# Patient Record
Sex: MALE | Race: WHITE | URBAN - METROPOLITAN AREA
[De-identification: names, ages, dates, MRNs, and addresses within clinical notes are randomized per-mention and may not be internally consistent; named-entity substitution may affect disease eponyms.]

---

## 2017-01-01 ENCOUNTER — TRANSFERRED RECORDS (OUTPATIENT)
Dept: HEALTH INFORMATION MANAGEMENT | Facility: CLINIC | Age: 72
End: 2017-01-01

## 2017-01-01 ENCOUNTER — PRE VISIT (OUTPATIENT)
Dept: PULMONOLOGY | Facility: CLINIC | Age: 72
End: 2017-01-01

## 2017-01-01 ENCOUNTER — TELEPHONE (OUTPATIENT)
Dept: ONCOLOGY | Facility: CLINIC | Age: 72
End: 2017-01-01

## 2017-01-01 ENCOUNTER — ONCOLOGY VISIT (OUTPATIENT)
Dept: ONCOLOGY | Facility: CLINIC | Age: 72
End: 2017-01-01
Payer: COMMERCIAL

## 2017-01-01 ENCOUNTER — HOSPITAL ENCOUNTER (OUTPATIENT)
Dept: MRI IMAGING | Facility: CLINIC | Age: 72
End: 2017-03-14
Attending: INTERNAL MEDICINE
Payer: MEDICARE

## 2017-01-01 ENCOUNTER — HOSPITAL ENCOUNTER (OUTPATIENT)
Facility: CLINIC | Age: 72
End: 2017-01-01
Attending: INTERNAL MEDICINE | Admitting: INTERNAL MEDICINE
Payer: MEDICARE

## 2017-01-01 ENCOUNTER — OFFICE VISIT (OUTPATIENT)
Dept: INTERVENTIONAL RADIOLOGY/VASCULAR | Facility: CLINIC | Age: 72
End: 2017-01-01

## 2017-01-01 ENCOUNTER — APPOINTMENT (OUTPATIENT)
Dept: MEDSURG UNIT | Facility: CLINIC | Age: 72
End: 2017-01-01
Attending: INTERNAL MEDICINE
Payer: MEDICARE

## 2017-01-01 ENCOUNTER — MEDICAL CORRESPONDENCE (OUTPATIENT)
Dept: HEALTH INFORMATION MANAGEMENT | Facility: CLINIC | Age: 72
End: 2017-01-01

## 2017-01-01 ENCOUNTER — OFFICE VISIT (OUTPATIENT)
Dept: PULMONOLOGY | Facility: CLINIC | Age: 72
End: 2017-01-01
Attending: INTERNAL MEDICINE
Payer: MEDICARE

## 2017-01-01 ENCOUNTER — TELEPHONE (OUTPATIENT)
Dept: INTERVENTIONAL RADIOLOGY/VASCULAR | Facility: CLINIC | Age: 72
End: 2017-01-01

## 2017-01-01 ENCOUNTER — HOSPITAL ENCOUNTER (OUTPATIENT)
Facility: CLINIC | Age: 72
Discharge: NURSING, REHAB OR LONG-TERM CARE | End: 2017-04-06
Attending: INTERNAL MEDICINE | Admitting: INTERNAL MEDICINE
Payer: MEDICARE

## 2017-01-01 ENCOUNTER — DOCUMENTATION ONLY (OUTPATIENT)
Dept: OTHER | Facility: CLINIC | Age: 72
End: 2017-01-01

## 2017-01-01 ENCOUNTER — CARE COORDINATION (OUTPATIENT)
Dept: ONCOLOGY | Facility: CLINIC | Age: 72
End: 2017-01-01

## 2017-01-01 ENCOUNTER — HOSPITAL ENCOUNTER (OUTPATIENT)
Dept: PET IMAGING | Facility: CLINIC | Age: 72
Discharge: HOME | End: 2017-03-14
Attending: INTERNAL MEDICINE | Admitting: INTERNAL MEDICINE
Payer: MEDICARE

## 2017-01-01 ENCOUNTER — HOSPITAL ENCOUNTER (OUTPATIENT)
Dept: CT IMAGING | Facility: CLINIC | Age: 72
End: 2017-04-06
Attending: CLINICAL NURSE SPECIALIST | Admitting: INTERNAL MEDICINE
Payer: MEDICARE

## 2017-01-01 VITALS
HEART RATE: 80 BPM | HEIGHT: 68 IN | RESPIRATION RATE: 20 BRPM | TEMPERATURE: 97.4 F | DIASTOLIC BLOOD PRESSURE: 84 MMHG | BODY MASS INDEX: 27.13 KG/M2 | SYSTOLIC BLOOD PRESSURE: 146 MMHG | OXYGEN SATURATION: 95 % | WEIGHT: 179 LBS

## 2017-01-01 VITALS
TEMPERATURE: 98.1 F | SYSTOLIC BLOOD PRESSURE: 137 MMHG | OXYGEN SATURATION: 94 % | HEART RATE: 94 BPM | DIASTOLIC BLOOD PRESSURE: 69 MMHG

## 2017-01-01 VITALS
OXYGEN SATURATION: 95 % | SYSTOLIC BLOOD PRESSURE: 143 MMHG | WEIGHT: 178 LBS | HEART RATE: 86 BPM | HEIGHT: 68 IN | TEMPERATURE: 97.4 F | BODY MASS INDEX: 26.98 KG/M2 | DIASTOLIC BLOOD PRESSURE: 83 MMHG | RESPIRATION RATE: 20 BRPM

## 2017-01-01 VITALS
DIASTOLIC BLOOD PRESSURE: 84 MMHG | WEIGHT: 179 LBS | OXYGEN SATURATION: 95 % | RESPIRATION RATE: 20 BRPM | HEART RATE: 80 BPM | BODY MASS INDEX: 27.13 KG/M2 | SYSTOLIC BLOOD PRESSURE: 146 MMHG | HEIGHT: 68 IN | TEMPERATURE: 97.4 F

## 2017-01-01 VITALS
DIASTOLIC BLOOD PRESSURE: 78 MMHG | SYSTOLIC BLOOD PRESSURE: 128 MMHG | WEIGHT: 175 LBS | BODY MASS INDEX: 26.83 KG/M2 | OXYGEN SATURATION: 97 % | HEART RATE: 83 BPM

## 2017-01-01 VITALS
DIASTOLIC BLOOD PRESSURE: 90 MMHG | HEIGHT: 68 IN | WEIGHT: 181 LBS | BODY MASS INDEX: 27.43 KG/M2 | RESPIRATION RATE: 20 BRPM | HEART RATE: 92 BPM | SYSTOLIC BLOOD PRESSURE: 150 MMHG | TEMPERATURE: 97.2 F | OXYGEN SATURATION: 96 %

## 2017-01-01 VITALS
BODY MASS INDEX: 27.26 KG/M2 | HEART RATE: 86 BPM | SYSTOLIC BLOOD PRESSURE: 138 MMHG | RESPIRATION RATE: 18 BRPM | TEMPERATURE: 97.5 F | WEIGHT: 179.9 LBS | HEIGHT: 68 IN | DIASTOLIC BLOOD PRESSURE: 93 MMHG | OXYGEN SATURATION: 93 %

## 2017-01-01 VITALS
DIASTOLIC BLOOD PRESSURE: 82 MMHG | SYSTOLIC BLOOD PRESSURE: 128 MMHG | HEART RATE: 80 BPM | RESPIRATION RATE: 18 BRPM | OXYGEN SATURATION: 95 %

## 2017-01-01 DIAGNOSIS — C25.9 MALIGNANT NEOPLASM OF PANCREAS, UNSPECIFIED LOCATION OF MALIGNANCY (H): Primary | ICD-10-CM

## 2017-01-01 DIAGNOSIS — C25.0 MALIGNANT NEOPLASM OF HEAD OF PANCREAS (H): Primary | ICD-10-CM

## 2017-01-01 DIAGNOSIS — C25.0 MALIGNANT NEOPLASM OF HEAD OF PANCREAS (H): ICD-10-CM

## 2017-01-01 DIAGNOSIS — R91.1 LESION OF LUNG: Primary | ICD-10-CM

## 2017-01-01 DIAGNOSIS — M89.9 BONE LESION: ICD-10-CM

## 2017-01-01 DIAGNOSIS — G89.3 NEOPLASM RELATED PAIN: ICD-10-CM

## 2017-01-01 DIAGNOSIS — C79.51 BONE METASTASIS: ICD-10-CM

## 2017-01-01 DIAGNOSIS — R91.1 LESION OF LUNG: ICD-10-CM

## 2017-01-01 DIAGNOSIS — C79.51 BONE METASTASES: ICD-10-CM

## 2017-01-01 DIAGNOSIS — R91.8 PULMONARY NODULES: Primary | ICD-10-CM

## 2017-01-01 DIAGNOSIS — Z71.89 ADVANCED DIRECTIVES, COUNSELING/DISCUSSION: Chronic | ICD-10-CM

## 2017-01-01 DIAGNOSIS — Z71.89 ADVANCE CARE PLANNING: ICD-10-CM

## 2017-01-01 DIAGNOSIS — G89.3 NEOPLASM RELATED PAIN: Primary | ICD-10-CM

## 2017-01-01 DIAGNOSIS — C79.9 METASTASIS FROM PANCREATIC CANCER (H): Primary | ICD-10-CM

## 2017-01-01 DIAGNOSIS — R91.8 LUNG NODULES: ICD-10-CM

## 2017-01-01 DIAGNOSIS — Q85.1 TS (TUBEROUS SCLEROSIS) (H): ICD-10-CM

## 2017-01-01 DIAGNOSIS — J98.4 CAVITARY LESION OF LUNG: ICD-10-CM

## 2017-01-01 DIAGNOSIS — C25.9 METASTASIS FROM PANCREATIC CANCER (H): Primary | ICD-10-CM

## 2017-01-01 LAB
COPATH REPORT: NORMAL
COPATH REPORT: NORMAL
CREAT BLD-MCNC: 0.8 MG/DL (ref 0.66–1.25)
CREAT SERPL-MCNC: 0.7 MG/DL (ref 0.66–1.25)
GFR SERPL CREATININE-BSD FRML MDRD: >90 ML/MIN/1.7M2
GFR SERPL CREATININE-BSD FRML MDRD: NORMAL ML/MIN/1.7M2
GLUCOSE BLDC GLUCOMTR-MCNC: 108 MG/DL (ref 70–99)
HCT VFR BLD AUTO: 41.9 % (ref 40–53)
HGB BLD-MCNC: 14.4 G/DL (ref 13.3–17.7)
INR PPP: 0.93 (ref 0.86–1.14)
LAB SCANNED RESULT: NORMAL
PLATELET # BLD AUTO: 284 10E9/L (ref 150–450)
RADIOLOGIST FLAGS: NORMAL

## 2017-01-01 PROCEDURE — 00000468 ZZHCL STATISTIC CYTO QA-OR TOUCH APT TC 88333: Performed by: RADIOLOGY

## 2017-01-01 PROCEDURE — 40000929 ZZHCL STATISTIC FOUNDATION ONE GENE PANEL: Performed by: INTERNAL MEDICINE

## 2017-01-01 PROCEDURE — 99205 OFFICE O/P NEW HI 60 MIN: CPT | Performed by: INTERNAL MEDICINE

## 2017-01-01 PROCEDURE — 27210995 ZZH RX 272: Performed by: RADIOLOGY

## 2017-01-01 PROCEDURE — 78816 PET IMAGE W/CT FULL BODY: CPT | Mod: PI

## 2017-01-01 PROCEDURE — 34300033 ZZH RX 343: Performed by: INTERNAL MEDICINE

## 2017-01-01 PROCEDURE — 99213 OFFICE O/P EST LOW 20 MIN: CPT | Performed by: INTERNAL MEDICINE

## 2017-01-01 PROCEDURE — 70553 MRI BRAIN STEM W/O & W/DYE: CPT

## 2017-01-01 PROCEDURE — A9552 F18 FDG: HCPCS | Performed by: INTERNAL MEDICINE

## 2017-01-01 PROCEDURE — 88307 TISSUE EXAM BY PATHOLOGIST: CPT | Performed by: RADIOLOGY

## 2017-01-01 PROCEDURE — 82962 GLUCOSE BLOOD TEST: CPT

## 2017-01-01 PROCEDURE — 40000166 ZZH STATISTIC PP CARE STAGE 1

## 2017-01-01 PROCEDURE — 27210910 ZZH NEEDLE CR6

## 2017-01-01 PROCEDURE — 99152 MOD SED SAME PHYS/QHP 5/>YRS: CPT

## 2017-01-01 PROCEDURE — 71260 CT THORAX DX C+: CPT

## 2017-01-01 PROCEDURE — 25500064 ZZH RX 255 OP 636: Performed by: INTERNAL MEDICINE

## 2017-01-01 PROCEDURE — 00000346 ZZHCL STATISTIC REVIEW OUTSIDE SLIDES TC 88321: Performed by: INTERNAL MEDICINE

## 2017-01-01 PROCEDURE — 27210259 CT BONE BIOPSY DEEP

## 2017-01-01 PROCEDURE — 99212 OFFICE O/P EST SF 10 MIN: CPT | Mod: ZF

## 2017-01-01 PROCEDURE — 25000128 H RX IP 250 OP 636: Performed by: PHYSICIAN ASSISTANT

## 2017-01-01 PROCEDURE — 82565 ASSAY OF CREATININE: CPT

## 2017-01-01 PROCEDURE — 99153 MOD SED SAME PHYS/QHP EA: CPT

## 2017-01-01 PROCEDURE — A9585 GADOBUTROL INJECTION: HCPCS | Performed by: INTERNAL MEDICINE

## 2017-01-01 PROCEDURE — 25000128 H RX IP 250 OP 636: Performed by: RADIOLOGY

## 2017-01-01 PROCEDURE — 25000125 ZZHC RX 250: Performed by: RADIOLOGY

## 2017-01-01 RX ORDER — SODIUM CHLORIDE 9 MG/ML
INJECTION, SOLUTION INTRAVENOUS CONTINUOUS
Status: DISCONTINUED | OUTPATIENT
Start: 2017-01-01 | End: 2017-01-01 | Stop reason: HOSPADM

## 2017-01-01 RX ORDER — OXYCODONE AND ACETAMINOPHEN 5; 325 MG/1; MG/1
1-2 TABLET ORAL EVERY 4 HOURS PRN
Qty: 180 TABLET | Refills: 0 | Status: SHIPPED | OUTPATIENT
Start: 2017-01-01 | End: 2017-01-01

## 2017-01-01 RX ORDER — FENTANYL CITRATE 50 UG/ML
25-50 INJECTION, SOLUTION INTRAMUSCULAR; INTRAVENOUS EVERY 5 MIN PRN
Status: DISCONTINUED | OUTPATIENT
Start: 2017-01-01 | End: 2017-01-01 | Stop reason: HOSPADM

## 2017-01-01 RX ORDER — NALOXONE HYDROCHLORIDE 0.4 MG/ML
.1-.4 INJECTION, SOLUTION INTRAMUSCULAR; INTRAVENOUS; SUBCUTANEOUS
Status: DISCONTINUED | OUTPATIENT
Start: 2017-01-01 | End: 2017-01-01 | Stop reason: HOSPADM

## 2017-01-01 RX ORDER — IOPAMIDOL 755 MG/ML
70-135 INJECTION, SOLUTION INTRAVASCULAR ONCE
Status: COMPLETED | OUTPATIENT
Start: 2017-01-01 | End: 2017-01-01

## 2017-01-01 RX ORDER — SODIUM CHLORIDE 9 MG/ML
INJECTION, SOLUTION INTRAVENOUS CONTINUOUS
Status: CANCELLED | OUTPATIENT
Start: 2017-01-01

## 2017-01-01 RX ORDER — OXYCODONE AND ACETAMINOPHEN 5; 325 MG/1; MG/1
1-2 TABLET ORAL EVERY 4 HOURS PRN
Qty: 270 TABLET | Refills: 0 | Status: SHIPPED | OUTPATIENT
Start: 2017-01-01

## 2017-01-01 RX ORDER — OXYCODONE AND ACETAMINOPHEN 5; 325 MG/1; MG/1
1-2 TABLET ORAL EVERY 4 HOURS PRN
COMMUNITY
Start: 2017-01-01 | End: 2017-01-01

## 2017-01-01 RX ORDER — OXYCODONE HYDROCHLORIDE 5 MG/1
5-10 TABLET ORAL EVERY 6 HOURS PRN
Qty: 30 TABLET | Refills: 0 | Status: SHIPPED | OUTPATIENT
Start: 2017-01-01 | End: 2017-01-01

## 2017-01-01 RX ORDER — LIDOCAINE 40 MG/G
CREAM TOPICAL
Status: CANCELLED | OUTPATIENT
Start: 2017-01-01

## 2017-01-01 RX ORDER — CALCIUM POLYCARBOPHIL 625 MG 625 MG/1
2 TABLET ORAL 2 TIMES DAILY
COMMUNITY

## 2017-01-01 RX ORDER — GADOBUTROL 604.72 MG/ML
7.5 INJECTION INTRAVENOUS ONCE
Status: COMPLETED | OUTPATIENT
Start: 2017-01-01 | End: 2017-01-01

## 2017-01-01 RX ORDER — MORPHINE SULFATE 15 MG/1
15 TABLET, FILM COATED, EXTENDED RELEASE ORAL EVERY 12 HOURS
Qty: 60 TABLET | Refills: 0 | Status: SHIPPED | OUTPATIENT
Start: 2017-01-01 | End: 2017-01-01

## 2017-01-01 RX ORDER — FLUMAZENIL 0.1 MG/ML
0.2 INJECTION, SOLUTION INTRAVENOUS
Status: DISCONTINUED | OUTPATIENT
Start: 2017-01-01 | End: 2017-01-01 | Stop reason: HOSPADM

## 2017-01-01 RX ORDER — MORPHINE SULFATE 60 MG/1
60 TABLET, FILM COATED, EXTENDED RELEASE ORAL EVERY 12 HOURS
Qty: 60 TABLET | Refills: 0 | Status: SHIPPED | OUTPATIENT
Start: 2017-01-01

## 2017-01-01 RX ADMIN — FENTANYL CITRATE 125 MCG: 50 INJECTION INTRAMUSCULAR; INTRAVENOUS at 14:33

## 2017-01-01 RX ADMIN — SODIUM CHLORIDE: 9 INJECTION, SOLUTION INTRAVENOUS at 09:49

## 2017-01-01 RX ADMIN — GADOBUTROL 7.5 ML: 604.72 INJECTION INTRAVENOUS at 11:27

## 2017-01-01 RX ADMIN — FLUDEOXYGLUCOSE F-18 11.54 MCI.: 500 INJECTION, SOLUTION INTRAVENOUS at 13:00

## 2017-01-01 RX ADMIN — IOPAMIDOL 98 ML: 755 INJECTION, SOLUTION INTRAVENOUS at 14:26

## 2017-01-01 RX ADMIN — MIDAZOLAM 2.5 MG: 1 INJECTION INTRAMUSCULAR; INTRAVENOUS at 14:33

## 2017-01-01 RX ADMIN — LIDOCAINE HYDROCHLORIDE 8 ML: 10 INJECTION, SOLUTION EPIDURAL; INFILTRATION; INTRACAUDAL; PERINEURAL at 15:06

## 2017-01-01 ASSESSMENT — PAIN SCALES - GENERAL
PAINLEVEL: NO PAIN (0)

## 2017-01-01 ASSESSMENT — ENCOUNTER SYMPTOMS
DIARRHEA: 0
VOMITING: 0
RECTAL PAIN: 0
HEARTBURN: 0
RECTAL BLEEDING: 0
ABDOMINAL PAIN: 1
BLOOD IN STOOL: 0
BOWEL INCONTINENCE: 0
JAUNDICE: 0
BLOATING: 0
NAUSEA: 0
CONSTIPATION: 1

## 2017-03-09 PROBLEM — C25.0 MALIGNANT NEOPLASM OF HEAD OF PANCREAS (H): Status: ACTIVE | Noted: 2017-01-01

## 2017-03-09 NOTE — NURSING NOTE
"Paul Mathias Jennissen is a 71 year old male who presents for:  No chief complaint on file.       Initial Vitals:  /90 (BP Location: Left arm, Patient Position: Chair, Cuff Size: Adult Large)  Pulse 92  Temp 97.2  F (36.2  C) (Oral)  Resp 20  Ht 1.727 m (5' 8\")  Wt 82.1 kg (181 lb)  SpO2 96%  BMI 27.52 kg/m2 Estimated body mass index is 27.52 kg/(m^2) as calculated from the following:    Height as of this encounter: 1.727 m (5' 8\").    Weight as of this encounter: 82.1 kg (181 lb).. Body surface area is 1.98 meters squared. BP completed using cuff size: large  No Pain (0) No LMP for male patient. Allergies and medications reviewed.     Medications: MEDICATION REFILLS NEEDED TODAY.  Pharm[facility] into [facility] [address] [place] [address]    Comments:     8 minutes for nursing intake (face to face time)   JESSICA ZEPEDA LPN          "

## 2017-03-09 NOTE — MR AVS SNAPSHOT
After Visit Summary   3/9/2017    Paul Mathias Jennissen    MRN: [id number]           Patient Information     Date Of Birth          1945        Visit Information        Provider Department      3/9/2017 1:00 PM Lois Lomas MD [facility]        Today's Diagnoses     Malignant neoplasm of head of pancreas (H)    -  1    TS (tuberous sclerosis) (H)           Follow-ups after your visit        Additional Services     GENERAL SURG ADULT REFERRAL       Canales- Pancreatic Cancer            PALLIATIVE CARE REFERRAL                 Your next 10 appointments already scheduled     Mar 14, 2017 11:00 AM CDT   MR BRAIN W/O & W CONTRAST with UUMR2   [facility], [place], MRI ([facility], [facility])    [address] [address] [phone]           Take your medicines as usual, unless your doctor tells you not to. Bring a list of your current medicines to your exam (including vitamins, minerals and over-the-counter drugs).  You will be given intravenous contrast for this exam. To prepare:   The day before your exam, drink extra fluids at least six 8-ounce glasses (unless your doctor tells you to restrict your fluids).   Have a blood test (creatinine test) within 30 days of your exam. Go to your clinic or Diagnostic Imaging Department for this test.  The MRI machine uses a strong magnet. Please wear clothes without metal (snaps, zippers). A sweatsuit works well, or we may give you a hospital gown.  Please remove any body piercings and hair extensions before you arrive. You will also remove watches, jewelry, hairpins, wallets, dentures, partial dental plates and hearing aids. You may wear contact lenses, and you may be able to wear your rings. We have a safe place to keep your personal items, but it is safer to leave them at home.   **IMPORTANT** THE INSTRUCTIONS BELOW ARE ONLY FOR THOSE PATIENTS WHO HAVE BEEN TOLD THEY WILL  RECEIVE SEDATION OR GENERAL ANESTHESIA DURING THEIR MRI PROCEDURE:  IF YOU WILL RECEIVE SEDATION (take medicine to help you relax during your exam):   You must get the medicine from your doctor before you arrive. Bring the medicine to the exam. Do not take it at home.   Arrive one hour early. Bring someone who can take you home after the test. Your medicine will make you sleepy. After the exam, you may not drive, take a bus or take a taxi by yourself.   No eating 8 hours before your exam. You may have clear liquids up until 4 hours before your exam. (Clear liquids include water, clear tea, black coffee and fruit juice without pulp.)  IF YOU WILL RECEIVE ANESTHESIA (be asleep for your exam):   Arrive 1 1/2 hours early. Bring someone who can take you home after the test. You may not drive, take a bus or take a taxi by yourself.   No eating 8 hours before your exam. You may have clear liquids up until 4 hours before your exam. (Clear liquids include water, clear tea, black coffee and fruit juice without pulp.)  Please call the Imaging Department at your exam site with any questions.            Mar 14, 2017  1:30 PM CDT   PE NPET ONCOLOGY (EYES TO THIGHS) with UUPET1   [facility], [place] PET CT ([facility] [place])    [address] [address] [phone]           Tell your doctor:   If there is any chance you may be pregnant or if you are breastfeeding.   If you have problems lying in small spaces (claustrophobia). If you do, your doctor may give you medicine to help you relax. If you have diabetes:   Have your exam early in the morning. Your blood glucose will go up as the day goes by.   Your glucose level must be 180 or less at the start of the exam. Please take any medicines you need to ensure this blood glucose level. 24 hours before your scan: Don t do any heavy exercise. (No jogging, aerobics or other workouts.) Exercise will make your pictures less  accurate. 6 hours before your scan:   Stop all food and liquids (except water).   Do not chew gum or suck on mints.   If you need to take medicine with food, you may take it with a few crackers.  Please call your Imaging Department at your exam site with any questions.            Mar 27, 2017 11:00 AM CDT   (Arrive by 10:45 AM)   New Patient Visit with Konstantin Canales MD   [facility] ([facility])    [address]  2nd Floor  [facility] [address] [phone]              Future tests that were ordered for you today     Open Future Orders        Priority Expected Expires Ordered    NPET Oncology (Eyes to Thighs) Routine  6/7/2017 3/9/2017    MRI Brain w & w/o contrast Routine  6/7/2017 3/9/2017            Who to contact     If you have questions or need follow up information about today's clinic visit or your schedule please contact [facility] directly at [phone].  Normal or non-critical lab and imaging results will be communicated to you by Bike HUDhart, letter or phone within 4 business days after the clinic has received the results. If you do not hear from us within 7 days, please contact the clinic through Bike HUDhart or phone. If you have a critical or abnormal lab result, we will notify you by phone as soon as possible.  Submit refill requests through evly or call your pharmacy and they will forward the refill request to us. Please allow 3 business days for your refill to be completed.          Additional Information About Your Visit        evly Information     evly is an electronic gateway that provides easy, online access to your medical records. With evly, you can request a clinic appointment, read your test results, renew a prescription or communicate with your care team.     To sign up for evly visit the website at [email or web address]   You will be asked to enter the access code listed below, as well as some  "personal information. Please follow the directions to create your username and password.     Your access code is: [email or web address]  Expires: 2017  2:10 PM     Your access code will  in 90 days. If you need help or a new code, please contact your [facility] or call [phone] for assistance.        Care EveryWhere ID     This is your Care EveryWhere ID. This could be used by other organizations to access your [place] medical records  [id number]        Your Vitals Were     Pulse Temperature Respirations Height Pulse Oximetry BMI (Body Mass Index)    92 97.2  F (36.2  C) (Oral) 20 1.727 m (5' 8\") 96% 27.52 kg/m2       Blood Pressure from Last 3 Encounters:   17 150/90    Weight from Last 3 Encounters:   17 82.1 kg (181 lb)              We Performed the Following     GENERAL SURG ADULT REFERRAL     PALLIATIVE CARE REFERRAL          Today's Medication Changes          These changes are accurate as of: 3/9/17  2:39 PM.  If you have any questions, ask your nurse or doctor.               These medicines have changed or have updated prescriptions.        Dose/Directions    * OXYCODONE HCL PO   This may have changed:  Another medication with the same name was added. Make sure you understand how and when to take each.        Dose:  5 mg   Take 5 mg by mouth every 6 hours as needed   Refills:  0       * oxyCODONE 5 MG IR tablet   Commonly known as:  ROXICODONE   This may have changed:  You were already taking a medication with the same name, and this prescription was added. Make sure you understand how and when to take each.   Used for:  Malignant neoplasm of head of pancreas (H)        Dose:  5-10 mg   Take 1-2 tablets (5-10 mg) by mouth every 6 hours as needed for moderate to severe pain or pain   Quantity:  30 tablet   Refills:  0       * Notice:  This list has 2 medication(s) that are the same as other medications prescribed for you. Read the directions carefully, and ask " your doctor or other care provider to review them with you.         Where to get your medicines      Some of these will need a paper prescription and others can be bought over the counter.  Ask your nurse if you have questions.     Bring a paper prescription for each of these medications     oxyCODONE 5 MG IR tablet                Primary Care Provider Office Phone # Fax #    Allie Lomas [phone]       [facility] [address] [id number]        Thank you!     Thank you for choosing [facility]  for your care. Our goal is always to provide you with excellent care. Hearing back from our patients is one way we can continue to improve our services. Please take a few minutes to complete the written survey that you may receive in the mail after your visit with us. Thank you!             Your Updated Medication List - Protect others around you: Learn how to safely use, store and throw away your medicines at www.disposemymeds.org.          This list is accurate as of: 3/9/17  2:39 PM.  Always use your most recent med list.                   Brand Name Dispense Instructions for use    calcium polycarbophil 625 MG tablet    FIBERCON     Take 2 tablets by mouth daily       * OXYCODONE HCL PO      Take 5 mg by mouth every 6 hours as needed       * oxyCODONE 5 MG IR tablet    ROXICODONE    30 tablet    Take 1-2 tablets (5-10 mg) by mouth every 6 hours as needed for moderate to severe pain or pain       TEGRETOL PO      Take 200 mg by mouth 2 times daily       * Notice:  This list has 2 medication(s) that are the same as other medications prescribed for you. Read the directions carefully, and ask your doctor or other care provider to review them with you.

## 2017-03-10 NOTE — PROGRESS NOTES
March 9, 2017         Allie Lomas MD   [facility]   [address] [place] [address]      Dear Dr. Lomas:      Thank you for asking giving us the opportunity to see your patient, Paul Jennissen, in consultation for further evaluation and management of his recently diagnosed pancreatic cancer.  Please see the enclosed note for details of our visit on March 9, 2017, as well as our recommendations.      HISTORY OF PRESENT ILLNESS:  Paul Jennissen is a 71-year-old gentleman who presents to [facility] to discuss his newly diagnosed pancreatic cancer.  The patient's history dates back, to February 2017 when he started experiencing left lower quadrant pain, left flank, radiating to his back.  He underwent CT of chest and pelvis on February 17, 2017, and this did show too numerous to count nodules in lung bases, some of which were cavitary.  There was also abnormal lymphadenopathy in the mesentery with mesenteric fat stranding as well as some ill-defined soft tissue at the mesenteric root around the SMA, thought to represent metastatic disease, colonic diverticulitis.  CT of the chest was then performed on February 20, 2017, which showed a mass in the region of the pancreatic head surrounding the superior mesenteric artery and replaced right hepatic artery.  Innumerable pulmonary nodules throughout both lungs with cavitation.  The patient went on to have an upper EUS on February 23, 2017, at [facility], and this did show a 35 x 33 mm mass in the pancreatic head, encasing the SMA, but no invasion to the portal vein.  The patient did not have obstructive CBD or PD.  This was staged as [id number].  FNA was performed of the pancreatic head mass, and this did confirm an adenocarcinoma.  The patient initially wanted to go to the [facility] since he has a history of tuberous sclerosis, but because of the wait time, he has come to the [facility].       On today's visit, the patient states that with his left lower quadrant pain, he was found to have diverticulitis, and treated with Flagyl and Cipro, and that pain has resolved; however, he does have right flank pain radiating to the back now, and has been taking Percocet, about six in 24 hours, and his last dose was at about 3:30 this morning.  Denies any fevers, chills, nausea, vomiting, chest pain, or shortness of breath.  No new palpable masses.  Did not notice any blood in stools, black tarry stools, change in color of stools, or jaundice.  He has lost about 29 pounds since 2017, but he has been on Weight Watchers, and is very active, healthy, exercising.  Remainder of comprehensive review of systems is negative.      PAST MEDICAL HISTORY:   1.  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  Interestingly, the patient brought in the literature that has been written about him through [facility].  He was diagnosed after having a seizure. Noted in lung/brain.  2.  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   3.  History of diverticulitis.   4.  Pancreatic cancer.   5.  Coronary artery disease, stent placement in 2001.      ALLERGIES:  Statins causes rash.      SOCIAL HISTORY:   to his wife, Dina, for 52 years.  Has five children; four daughters and one son.  Chanel, one of his daughters, accompanies him today.  No history of tobacco use.  Alcohol daily but is trying to cut down.  He was a , and then worked for [employer].  He is also a  of the [employer].      FAMILY HISTORY:  His sister, Dahlia,  of colon cancer at the age of 52.  His brother, Chente,  of kidney cancer at 73.  Sister, Marya,  of breast cancer at 72.  Sister, Josi, was diagnosed with breast cancer at 87.  Brother, Jordon, had neck cancer diagnosed at 66 and is alive.  One daughter and one son both  have tuberous sclerosis.  A grandson (daughter's son) has tuberous sclerosis.  Sister, Concepción was diagnosed with colon cancer at the age of 83.      PHYSICAL EXAM:   VITAL SIGNS:  Blood pressure 158/90, pulse 92, respirations 20, temperature 97.2, pulse ox 96% on room air.  Weight is 181 pounds.   GENERAL:  Comfortable, in no acute distress, pleasant.   HEENT:  Atraumatic, normocephalic.  Pupils are equal, round, reactive.  Sclerae anicteric.  Oropharynx with moist membranes.  No lesions, ulcers or exudate.   NECK:  Supple, full range of motion.  Trachea midline.   HEART:  Regular rate and rhythm, normal S1-S2.  No murmurs or gallop.  No edema.   LUNGS:  Clear to auscultation bilaterally.  No crackles or wheezes.  Normal respiratory effort.   ABDOMEN:  Positive bowel sounds.  Soft, nontender, nondistended.  No hepatosplenomegaly.   EXTREMITIES:  No cyanosis.  Warm.   MUSCULOSKELETAL:  No point tenderness.   LYMPH:  No cervical, supraclavicular, or axillary nodes palpable.   SKIN:  No petechiae or rashes.   NEURO:  Alert and oriented.      LABS:  From [facility], February 23, 2017:  Amylase 209, lipase 225.9, ALT 46, alkaline phosphatase 103, AST 38.  WBC 6.9, hemoglobin 15.0, hematocrit 43.6, platelets 287,000.      From February 21, 2017:  AFP 0.9.  CA 19-9 was 447.      IMAGING:  As stated in the HPI.      PATHOLOGY:  As stated in the HPI.      ASSESSMENT:  Paul Jennissen is a 71-year-old gentleman with newly diagnosed [id number] adenocarcinoma of the pancreatic head.  I discussed at length with the patient, his wife, and daughter, Chanel, regarding the diagnosis, findings to date, and next steps.  I explained to the patient that this is likely to be pancreatic cancer, based on the location of the mass as well as elevated CA 19-9.  The patient's wife questioned whether this may have started in a lung or somewhere else.  I explained to them that it is more likely the pancreas primary based on scan versus lung going to the  pancreas.  However, in any case, at this point we do not even know if he has lung metastases.  Some of these lesions may be his tuberous sclerosis, and therefore it is important to further evaluate.  Therefore, I think the next step is full staging with PET scan.  The lesions that are seen elsewhere may be related to his tuberous sclerosis, and therefore it is important to decipher this.  According to the wife, there is a Tuberous Sclerosis Clinic at the [place].  We will look into this further, but at the very least, he should be seen by Pulmonary to help further evaluate the lung nodules.  I also think it would be helpful to do a brain MRI since it has been a few years since this was done, and to have a new baseline.      The patient is currently taking Percocet for pain.  We will switch him over to oxycodone, and have him meet with our Palliative Care team since this most likely will be an ongoing issue.      The question is whether the patient is a surgical candidate, and I would like to refer him to Dr. Canales for his input.      PLAN:   1.  PET scan.   2.  Brain MRI.   3.  Referral to Dr. Canales.   4.  Referral to Tuberous Sclerosis Clinic/Pulmonary Clinic.   5.  Palliative Care referral.   6.  Start oxycodone.  The patient is to keep a log of how much he is requiring.      At the end of the discussion, all questions addressed to the best of my ability, the patient and his family members have verbalized understanding and are agreeable with the plan.      Dr. Lomas, thank you for giving me the opportunity to participate in this delightful patient's care.  If you have any questions, please feel free to contact me.      Sincerely,         RIKKI LOMAS MD       Addendum: Informed the [place] does not have TS clinic. Will refer to Pulmonary.      D: 2017 14:04   T: 03/10/2017 05:16   MT: [id number]      Name:     JENNISSEN, PAUL   MRN:      [id number]        Account:      [id number]   :       1945           Visit Date:   03/09/2017      Document: [id number]       cc: Allie Lomas MD

## 2017-03-10 NOTE — TELEPHONE ENCOUNTER
1.  Date/reason for appt: 3/15/17 - Lung Lesions, Pancreatic CA  2.  Referring provider: Dr Lois Lomas    3.  Call to patient (Yes / No - short description): No - scheduled per Tigist @   4.  Previous care at / records requested from:  - records in Epic  5.  Recent Imagin17 CT chest - in [facility]    *Pt is scheduled for PET scan on 3/14/17*

## 2017-03-10 NOTE — TELEPHONE ENCOUNTER
1. Date/reason for appt: 3/14/17 - Lung Lesions, Pancreatic CA  2. Referring provider: Dr Lois Lomas    3. Call to patient (Yes / No - short description): No - scheduled per Tigist @  - she will contact pt  4. Previous care at / records requested from:  - records in Epic  5. Recent Imagin17 CT chest - in [facility]     *Pt is scheduled for PET scan prior to appt with Dr Maldonado on 3/14/17*

## 2017-03-13 NOTE — PROGRESS NOTES
Received telephone call from patient's spouse, Dina, calling with an update for Dr. Lomas.  Spouse states that patient did not have good pain control with the oxycodone alone.  They spoke with his local PCP, Dr. Allie Lomas, over the weekend, who recommending changing back to Percocet 5/325 mg tablets.  Spouse states that the additional of Tylenol with the oxycodone really works well for patient's pain, and he seems to be much more comfortable now.  Patient is planning to meet with Palliative care later this week (scheduling is in process).  Per spouse, patient is doing well.  He did experience some constipation, and Colace was not helping him much.  He tried a dose of Dulcolax, which did produce a good bowel movement for him.    Spouse also wanted to mention to Dr. Lomas that patient uses a CPAP machine at night, which is something that they forgot to mention during their visit last week.  Will update Dr. Lomas.    David Mcgee, RN, BSN, OCN  Care Coordinator  [facility]  [phone]

## 2017-03-14 NOTE — MR AVS SNAPSHOT
After Visit Summary   3/14/2017    Paul Mathias Jennissen    MRN: [id number]           Patient Information     Date Of Birth          1945        Visit Information        Provider Department      3/14/2017 2:30 PM Hernandez Maldonado MD [facility]        Today's Diagnoses     Pulmonary nodules    -  1       Follow-ups after your visit        Your next 10 appointments already scheduled     Apr 05, 2017  9:30 AM CDT   Procedure 4.5 hour with U2A ROOM 7   [address] [facility] [place] ([facility], [facility])    [address] [facility] [address]               Apr 05, 2017 11:00 AM CDT   CT LUNG MEDIASTINUM BIOPSY with UUCT2   [facility], [place], [place] ([facility])    [address] [facility] [id number] [phone]           Plan for an adult to drive you home and stay with you until morning.  Tell your doctor in advance:   If you have any allergies.   If you are breastfeeding or there s any chance you are pregnant.  Please bring any scans or X-rays taken at other hospitals, if they may be helpful. Also bring a list of your medicines, including vitamins, minerals and over-the-counter drugs. It is safest to leave valuables at home.  If you take blood thinners, you may need to stop taking them a few days before treatment. Talk to your doctor before stopping these medicines. You will need a blood test the morning of your exam.   Stop taking Coumadin (warfarin) 5 days before treatment. Restart the day after treatment.   If you take aspirin, you may need to stop taking it 3 days before treatment.   If you take Plavix, Ticlid, Pletal or Persantine, you may need to stop taking them 5 days before your scan.  If you have diabetes:   If you take insulin, call your diabetes care team. Ask if you should adjust your insulin before this test.   If your kidney function is normal, continue taking your metformin  (Avandamet, Glucophage, Glucovance, Metaglip) on the day of your exam.   If your kidney function is abnormal, wait 48 hours before restarting this medicine.  If you have any questions, please call the imaging department where you will have your exam.  The day before your exam: Drink extra fluids at least six 8-ounce glasses (unless your doctor tells you to restrict fluids). The day of your exam: No eating or drinking for 4 hours before your test. You may take medicine with small sips of water.            Apr 13, 2017 12:00 PM CDT   [facility]    [address] [place] [address] [phone]            Apr 18, 2017  2:15 PM CDT   New Visit with Payton Robison GC   [facility]    [address] [place] [address] [phone]              Who to contact     If you have questions or need follow up information about today's clinic visit or your schedule please contact [facility] directly at [phone].  Normal or non-critical lab and imaging results will be communicated to you by MyChart, letter or phone within 4 business days after the clinic has received the results. If you do not hear from us within 7 days, please contact the clinic through viblasthart or phone. If you have a critical or abnormal lab result, we will notify you by phone as soon as possible.  Submit refill requests through DidLog or call your pharmacy and they will forward the refill request to us. Please allow 3 business days for your refill to be completed.          Additional Information About Your Visit        [employer]las[employer]ar[employer] Information     DidLog gives you secure access to your electronic health record. If you see a primary care provider, you can also send messages to your care team and make appointments. If you have questions, please call your primary care clinic.  If  "you do not have a primary care provider, please call [phone] and they will assist you.        Care EveryWhere ID     This is your Care EveryWhere ID. This could be used by other organizations to access your [place] medical records  [id number]        Your Vitals Were     Pulse Temperature Respirations Height Pulse Oximetry BMI (Body Mass Index)    86 97.5  F (36.4  C) (Oral) 18 1.727 m (5' 7.99\") 93% 27.36 kg/m2       Blood Pressure from Last 3 Encounters:   03/21/17 128/78   03/16/17 146/84   03/16/17 146/84    Weight from Last 3 Encounters:   03/21/17 79.4 kg (175 lb)   03/16/17 81.2 kg (179 lb)   03/16/17 81.2 kg (179 lb)              Today, you had the following     No orders found for display       Primary Care Provider Office Phone # Fax #    Allie Lomas [phone]       [place] [address]        Thank you!     Thank you for choosing [facility]  for your care. Our goal is always to provide you with excellent care. Hearing back from our patients is one way we can continue to improve our services. Please take a few minutes to complete the written survey that you may receive in the mail after your visit with us. Thank you!             Your Updated Medication List - Protect others around you: Learn how to safely use, store and throw away your medicines at www.disposemymeds.org.          This list is accurate as of: 3/14/17 11:59 PM.  Always use your most recent med list.                   Brand Name Dispense Instructions for use    calcium polycarbophil 625 MG tablet    FIBERCON     Take 2 tablets by mouth 2 times daily       DOCUSATE SODIUM PO      Take by mouth 2 times daily Reported on 3/21/2017       DULCOLAX PO      Take 4-8 tablets by mouth daily       TEGRETOL PO      Take 200 mg by mouth 2 times daily       UNABLE TO FIND      Take 2 capsules by mouth daily Wheat Grass         "

## 2017-03-14 NOTE — NURSING NOTE
"Paul Mathias Jennissen is a 71 year old male who presents for:  Chief Complaint   Patient presents with     Oncology Clinic Visit     Pancreatic Ca, Lung Lesions        Initial Vitals:  BP (!) 138/93 (BP Location: Left arm, Patient Position: Chair, Cuff Size: Adult Regular)  Pulse 86  Temp 97.5  F (36.4  C) (Oral)  Resp 18  Ht 1.727 m (5' 7.99\")  Wt 81.6 kg (179 lb 14.4 oz)  SpO2 93%  BMI 27.36 kg/m2 Estimated body mass index is 27.36 kg/(m^2) as calculated from the following:    Height as of this encounter: 1.727 m (5' 7.99\").    Weight as of this encounter: 81.6 kg (179 lb 14.4 oz).. Body surface area is 1.98 meters squared. BP completed using cuff size: regular  No Pain (0) No LMP for male patient. Allergies and medications reviewed.     Medications: Medication refills not needed today.  Pharmacy name entered into [employer]: [facility] [address] [place] [address]    Comments:     7 minutes for nursing intake (face to face time)   Gloria Loomis LPN        "

## 2017-03-14 NOTE — LETTER
3/14/2017       RE: Paul Mathias Jennissen  [address] [address] [address]     Dear Colleague,    Thank you for referring your patient, Paul Mathias Jennissen, to the [facility] at [facility]. Please see a copy of my visit note below.    Pulmonary Nodule Clinic    We have been asked by Dr. 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  His diagnosis was established in February 2017 where he underwent a biopsy of a pancreatic mass demonstrating pancreatic cancer.  He has no tobacco use history.  He does drink 3-4 alcoholic drinks per week.  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  Patient has had a 30 pound unintentional weight loss.  He has had a CT scan which showed a wall bilateral lower lobe predominant nodules as well as PET scan demonstrating increased PET activity of these pulmonary nodules.       Review of Systems:   10 point ROS performed with pertinent +/- noted in the HPI.  The remainder of the ROS was otherwise negative.        Pertinent Medications     Current Outpatient Prescriptions   Medication Sig     Bisacodyl (DULCOLAX PO) Take 4-8 tablets by mouth daily     DOCUSATE SODIUM PO Take by mouth 2 times daily Reported on 3/21/2017     UNABLE TO FIND Take 2 capsules by mouth daily Wheat Grass     CarBAMazepine (TEGRETOL PO) Take 200 mg by mouth 2 times daily     calcium polycarbophil (FIBERCON) 625 MG tablet Take 2 tablets by mouth 2 times daily      oxyCODONE-acetaminophen (PERCOCET) 5-325 MG per tablet Take 1-2 tablets by mouth every 4 hours as needed for pain     morphine (MS CONTIN) 15 MG 12 hr tablet Take  "1 tablet (15 mg) by mouth every 12 hours maximum 2 tablet(s) per day     No current facility-administered medications for this visit.           Allergies:      Allergies   Allergen Reactions     Hmg-Coa-R Inhibitors Rash, Hives and Itching          Past Medical Hx:         Malignant neoplasm of head of pancreas (H)      P ulmonary nodules.       Family Hx:     Family History   Problem Relation Age of Onset     Other - See Comments Mother 94     old age     Myocardial Infarction Father 88     Breast Cancer Sister 71     with mets     Coronary Artery Disease Brother 73     CANCER Brother      Myocardial Infarction Maternal Grandfather      Colon Cancer Sister 52     with mets     DIABETES Sister      Hypertension Sister      Arrhythmia Sister      DIABETES Brother      Hypertension Brother      Hyperlipidemia Brother      Tuberous Sclerosis Complex Son      brain lesion     Seizure Disorder Son      Tuberous Sclerosis Complex Daughter      brain lesion     Seizure Disorder Daughter         Social Hx:   No tobacco use history.   with five children.  Worked as a nutritionist for [employer].  Three alcoholic drinks per week.         Objective   Vitals:  BP (!) 138/93 (BP Location: Left arm, Patient Position: Chair, Cuff Size: Adult Regular)  Pulse 86  Temp 97.5  F (36.4  C) (Oral)  Resp 18  Ht 1.727 m (5' 7.99\")  Wt 81.6 kg (179 lb 14.4 oz)  SpO2 93%  BMI 27.36 kg/m2    General:  Adult male;appears stated age; no acute distress; the patient is a good historian  HEENT:  NCAT; EOMI; No icterus; no injection; MMM  Pulm: CTAB, normal to percussion  CV: RRR, no murmurs, rubs or gallops        Labs / Imaging/Studies     Pertnent Cultures / Microbiology:   None    Imaging:   PET:   1. Evidence of extensive metastatic disease includes extensive solid  and cavitary pulmonary nodular metastases, hilar and subcarinal  metastatic lymphadenopathy, retroperitoneal metastatic  lymphadenopathy, and a left sacral " metastasis.  2. Ill-defined hypoenhancing hypermetabolic mass in the uncinate  process of the pancreas is compatible with the patient's biopsy-proven  adenocarcinoma. The mass encases the superior mesenteric artery and  abuts the superior mesenteric and splenic veins without encasement.  3. Indeterminate enhancing lesion in the right L4-L5 neural foramen.  Recommend MRI of the lumbar spine with and without contrast for  further evaluation.  4. Sequela of the patient's known history of tuberous sclerosis  including calcified cortical tubers and calcified subependymal  nodules.         Assessment and Plan:   Assessment: This is a 71-year-old male with history of pancreatic cancer with multiple bilateral PET positive innumerable basilar predominant lower lobe nodules most consistent with pancreatic metastatic lesions.  In regards to move forward treatment I do not believe these are to be biopsied and can be presumed to be metastatic disease. After talking with Dr. Lomas a bx would be needed to document metastatic disease for clinical trials moving forward. Will discuss the case at the pulmonary nodule conference to see if IR can bx the nodules.     1. Pulmonary nodules  See above    I spent 25 minutes with direct face to face interaction with this patient and provided at least 50% of this time counseling and coordinating care for pulmonary nodules as noted above in the assessment and plan.      Hernandez Maldonado MD  Pulmonary and Critical Care  [facility]  Pager:  [phone]

## 2017-03-14 NOTE — PROGRESS NOTES
Pulmonary Nodule Clinic    We have been asked by Dr. 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  His diagnosis was established in February 2017 where he underwent a biopsy of a pancreatic mass demonstrating pancreatic cancer.  He has no tobacco use history.  He does drink 3-4 alcoholic drinks per week.  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  Patient has had a 30 pound unintentional weight loss.  He has had a CT scan which showed a wall bilateral lower lobe predominant nodules as well as PET scan demonstrating increased PET activity of these pulmonary nodules.       Review of Systems:   10 point ROS performed with pertinent +/- noted in the HPI.  The remainder of the ROS was otherwise negative.        Pertinent Medications     Current Outpatient Prescriptions   Medication Sig     Bisacodyl (DULCOLAX PO) Take 4-8 tablets by mouth daily     DOCUSATE SODIUM PO Take by mouth 2 times daily Reported on 3/21/2017     UNABLE TO FIND Take 2 capsules by mouth daily Wheat Grass     CarBAMazepine (TEGRETOL PO) Take 200 mg by mouth 2 times daily     calcium polycarbophil (FIBERCON) 625 MG tablet Take 2 tablets by mouth 2 times daily      oxyCODONE-acetaminophen (PERCOCET) 5-325 MG per tablet Take 1-2 tablets by mouth every 4 hours as needed for pain     morphine (MS CONTIN) 15 MG 12 hr tablet Take 1 tablet (15 mg) by mouth every 12 hours maximum 2 tablet(s) per day     No current facility-administered medications for this visit.           Allergies:      Allergies   Allergen Reactions     Hmg-Coa-R Inhibitors Rash, Hives and Itching          Past Medical Hx:         Malignant neoplasm of head  "of pancreas (H)      P ulmonary nodules.       Family Hx:     Family History   Problem Relation Age of Onset     Other - See Comments Mother 94     old age     Myocardial Infarction Father 88     Breast Cancer Sister 71     with mets     Coronary Artery Disease Brother 73     CANCER Brother      Myocardial Infarction Maternal Grandfather      Colon Cancer Sister 52     with mets     DIABETES Sister      Hypertension Sister      Arrhythmia Sister      DIABETES Brother      Hypertension Brother      Hyperlipidemia Brother      Tuberous Sclerosis Complex Son      brain lesion     Seizure Disorder Son      Tuberous Sclerosis Complex Daughter      brain lesion     Seizure Disorder Daughter         Social Hx:   No tobacco use history.   with five children.  Worked as a nutritionist for dairy farmers.  Three alcoholic drinks per week.         Objective   Vitals:  BP (!) 138/93 (BP Location: Left arm, Patient Position: Chair, Cuff Size: Adult Regular)  Pulse 86  Temp 97.5  F (36.4  C) (Oral)  Resp 18  Ht 1.727 m (5' 7.99\")  Wt 81.6 kg (179 lb 14.4 oz)  SpO2 93%  BMI 27.36 kg/m2    General:  Adult male;appears stated age; no acute distress; the patient is a good historian  HEENT:  NCAT; EOMI; No icterus; no injection; MMM  Pulm: CTAB, normal to percussion  CV: RRR, no murmurs, rubs or gallops        Labs / Imaging/Studies     Pertnent Cultures / Microbiology:   None    Imaging:   PET:   1. Evidence of extensive metastatic disease includes extensive solid  and cavitary pulmonary nodular metastases, hilar and subcarinal  metastatic lymphadenopathy, retroperitoneal metastatic  lymphadenopathy, and a left sacral metastasis.  2. Ill-defined hypoenhancing hypermetabolic mass in the uncinate  process of the pancreas is compatible with the patient's biopsy-proven  adenocarcinoma. The mass encases the superior mesenteric artery and  abuts the superior mesenteric and splenic veins without encasement.  3. Indeterminate " enhancing lesion in the right L4-L5 neural foramen.  Recommend MRI of the lumbar spine with and without contrast for  further evaluation.  4. Sequela of the patient's known history of tuberous sclerosis  including calcified cortical tubers and calcified subependymal  nodules.         Assessment and Plan:   Assessment: This is a 71-year-old male with history of pancreatic cancer with multiple bilateral PET positive innumerable basilar predominant lower lobe nodules most consistent with pancreatic metastatic lesions.  In regards to move forward treatment I do not believe these are to be biopsied and can be presumed to be metastatic disease. After talking with Dr. Lomas a bx would be needed to document metastatic disease for clinical trials moving forward. Will discuss the case at the pulmonary nodule conference to see if IR can bx the nodules.     1. Pulmonary nodules  See above    I spent 25 minutes with direct face to face interaction with this patient and provided at least 50% of this time counseling and coordinating care for pulmonary nodules as noted above in the assessment and plan.      Hernandez Maldonado MD  Pulmonary and Critical Care  [facility]  Pager:  [phone]

## 2017-03-16 NOTE — NURSING NOTE
"Paul Mathias Jennissen is a 71 year old male who presents for:  Chief Complaint   Patient presents with     Oncology Clinic Visit     f/u appt        Initial Vitals:  /84 (BP Location: Right arm, Patient Position: Chair, Cuff Size: Adult Large)  Pulse 80  Temp 97.4  F (36.3  C) (Oral)  Resp 20  Ht 1.72 m (5' 7.72\")  Wt 81.2 kg (179 lb)  SpO2 95%  BMI 27.44 kg/m2 Estimated body mass index is 27.44 kg/(m^2) as calculated from the following:    Height as of this encounter: 1.72 m (5' 7.72\").    Weight as of this encounter: 81.2 kg (179 lb).. Body surface area is 1.97 meters squared. BP completed using cuff size: large  No Pain (0) No LMP for male patient. Allergies and medications reviewed.     Medications: Medication refills not needed today.  Pharmacy name entered into [employer]: [facility] [address] [place] [address]    Comments:     8 minutes for nursing intake (face to face time)   JESSICA ZEPEDA LPN        "

## 2017-03-16 NOTE — MR AVS SNAPSHOT
After Visit Summary   3/16/2017    Paul Mathias Jennissen    MRN: [id number]           Patient Information     Date Of Birth          1945        Visit Information        Provider Department      3/16/2017 12:15 PM Devon Gilbert MD [facility]        Today's Diagnoses     Neoplasm related pain    -  1      Care Instructions    Constipation: I recommended taking 2 of the dulcolax, twice a day: 4 tabs total a day.  You can increase that to 8 pills total a day if needed.    Remember to call at least a week in advance for refills of the Percocet.            Follow-ups after your visit        Your next 10 appointments already scheduled     Mar 27, 2017 11:00 AM CDT   (Arrive by 10:45 AM)   New Patient Visit with Konstantin Canales MD   [facility] ([email or web address])    [address] [place] [id number] [phone]            Apr 05, 2017 11:00 AM CDT   Return Visit with Lois Lomas MD   [facility])    [address] [place] [address] [phone]            Apr 18, 2017  2:15 PM CDT   New Visit with Payton Robison GC   [facility])    [address] [place] [address] [phone]              Future tests that were ordered for you today     Open Future Orders        Priority Expected Expires Ordered    MRI Lumbar spine w & w/o contrast Routine  6/14/2017 3/16/2017            Who to contact     If you have questions or need follow up information about today's clinic visit or your schedule please contact [facility] directly at [phone].  Normal or non-critical lab and imaging results will be communicated to you by MyChart, letter or phone within 4 business days after the clinic has received the results. If you do not hear from us within 7 days, please contact the  "clinic through [employer] or phone. If you have a critical or abnormal lab result, we will notify you by phone as soon as possible.  Submit refill requests through [employer] or call your pharmacy and they will forward the refill request to us. Please allow 3 business days for your refill to be completed.          Additional Information About Your Visit        [employer]app[employer] Information     [employer] gives you secure access to your electronic health record. If you see a primary care provider, you can also send messages to your care team and make appointments. If you have questions, please call your primary care clinic.  If you do not have a primary care provider, please call [phone] and they will assist you.      [employer] is an electronic gateway that provides easy, online access to your medical records. With [employer], you can request a clinic appointment, read your test results, renew a prescription or communicate with your care team.     To access your existing account, please contact your [facility] or call [phone] for assistance.        Care EveryWhere ID     This is your Care EveryWhere ID. This could be used by other organizations to access your [place] medical records  [id number]        Your Vitals Were     Pulse Temperature Respirations Height Pulse Oximetry BMI (Body Mass Index)    80 97.4  F (36.3  C) (Oral) 20 1.727 m (5' 7.99\") 95% 27.22 kg/m2       Blood Pressure from Last 3 Encounters:   03/16/17 146/84   03/16/17 146/84   03/14/17 (!) 138/93    Weight from Last 3 Encounters:   03/16/17 81.2 kg (179 lb)   03/16/17 81.2 kg (179 lb)   03/14/17 81.6 kg (179 lb 14.4 oz)              Today, you had the following     No orders found for display         Where to get your medicines      Some of these will need a paper prescription and others can be bought over the counter.  Ask your nurse if you have questions.     Bring a paper prescription for each of these medications     " oxyCODONE-acetaminophen 5-325 MG per tablet          Primary Care Provider Office Phone # Fax #    Allie Lomas [phone]       [facility] [address] [facility] [address]        Thank you!     Thank you for choosing [facility]  for your care. Our goal is always to provide you with excellent care. Hearing back from our patients is one way we can continue to improve our services. Please take a few minutes to complete the written survey that you may receive in the mail after your visit with us. Thank you!             Your Updated Medication List - Protect others around you: Learn how to safely use, store and throw away your medicines at www.disposemymeds.org.          This list is accurate as of: 3/16/17  2:08 PM.  Always use your most recent med list.                   Brand Name Dispense Instructions for use    calcium polycarbophil 625 MG tablet    FIBERCON     Take 2 tablets by mouth 2 times daily       DOCUSATE SODIUM PO      Take by mouth 2 times daily       DULCOLAX PO      Take 4-8 tablets by mouth daily       oxyCODONE-acetaminophen 5-325 MG per tablet    PERCOCET    180 tablet    Take 1-2 tablets by mouth every 4 hours as needed for pain       TEGRETOL PO      Take 200 mg by mouth 2 times daily       UNABLE TO FIND      Take 2 capsules by mouth daily Wheat Grass

## 2017-03-16 NOTE — NURSING NOTE
Order for MRI Lumbar Spine faxed to [facility] Scheduling at fax number [phone].  Scheduling for MRI is currently in process.      David Mcgee RN, BSN, OCN

## 2017-03-16 NOTE — NURSING NOTE
"Paul Mathias Jennissen is a 71 year old male who presents for:  Chief Complaint   Patient presents with     Oncology Clinic Visit     NEW        Initial Vitals:  /84 (BP Location: Right arm, Patient Position: Chair, Cuff Size: Adult Large)  Pulse 80  Temp 97.4  F (36.3  C) (Oral)  Resp 20  Ht 1.727 m (5' 7.99\")  Wt 81.2 kg (179 lb)  SpO2 95%  BMI 27.22 kg/m2 Estimated body mass index is 27.22 kg/(m^2) as calculated from the following:    Height as of this encounter: 1.727 m (5' 7.99\").    Weight as of this encounter: 81.2 kg (179 lb).. Body surface area is 1.97 meters squared. BP completed using cuff size: large  No Pain (0) No LMP for male patient. Allergies and medications reviewed.     Medications: Medication refills not needed today.  Pharmacy name entered into [employer]: [facility] [address] [place] [address]    Comments:     8 minutes for nursing intake (face to face time)   JESSICA ZEPEDA LPN        "

## 2017-03-16 NOTE — PROGRESS NOTES
Palliative Staff/Outpatient Clinic    CC:  Pancreatic cancer    HPI:  Mr. Jennissen is seen with his wife and daughter today who supplement the history. I also reviewed the chart and some key details with him and spoke with Dr. Lomas today about his situation. I will not elaborate the full details here but briefly, he has a longstanding history of tuberous sclerosis and well controlled seizure disorder on carbamazepine. He was diagnosed with pancreatic cancer and there has been some discussion about its actual stage because he has multiple TS lesions throughout his body. Nonetheless, he recently had a PET scan and it seems apparent he has widespread metastatic cancer including lung and bone lesions. He met with Dr. Maldonado about whether he should get a lung biopsy today around that and he is also taking with Dr. Lomas to discuss PET scan results and what to do. When I met with him he appeared to have an awareness he has metastatic cancer and in fact tells me that he and his family have been talking about what to do and while he would like to have more time and good quality of life he is not really at all willing to sacrifice quality of life just for more time. He is looking forward to talking with Dr. Lomas about that. He would tolerate mild side effects from some treatment but really would have a high threshold for side effects in order for him to continue chemotherapy. In that context, his wife is a retired nurse and what sounds like a nurse executive who started a couple of hospice agencies up in the [place] area where they live and they are very familiar with hospice and end of life care. He says his family is supportive of whatever decisions he would make. We talked about hospice and what it would look like for him. All that discussion was in the context of him yet speaking with Dr. Lomas about the PET scan results and what she could recommend to him and it certainly was premature to make any hospice  decisions today to say otherwise.       Pain wise he is doing okay. He had been on Percocet and then plain oxycodone but it turns out Percocet was much more effective with him. He is taking 2 tabs at a time b.i.d. or t.i.d. with really excellent results and no side effects apart from constipation. He is now taking over the counter oral bisacodyl 3 tablets a day and docusate and that is helping. Besides that, no side effects. He is worried about getting refills because his primary care doctor close to home is actually semiretired and they are not sure how reliable that will be.       Otherwise he is doing well symptomatically. His major symptoms have been weight loss and anorexia and some fatigue but he remains independent in his ADLs and quite active. He runs 5Ks all the time and is thinking about doing a slow run/walk and asked me if he can exercise (he can). He has several children, 2 of whom have tuberous sclerosis and some grandchildren and all of them are in pretty good health. He looks forward to spending time with them and wants to live with dignity and die peacefully.       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 He is 1 of 14 children and has many siblings and nieces and nephews in the area. He continues to run and exercise and lift weights.       REVIEW OF SYMPTOMS: Comprehensive review of systems is negative except as described above.       PHYSICAL EXAMINATION: Vitals noted. Alert, pleasant, well developed older man in no acute distress. Head: NC/AT. Eyes anicteric without injection. Mouth pink, moist, no lesions. Neck supple, no adenopathy. Lungs unlabored, clear to auscultation bilaterally. CV: Regular rate, rhythm, S1, S2. Abdomen is soft, nontender and nondistended. Most of his pain is in his epigastrium and I did not notice anything there. Lower extremities are without clubbing, cyanosis or edema. Neuropsychological exam is normal.        IMPRESSION/RECOMMENDATIONS: A 71-year-old man with metastatic pancreatic cancer contemplating treatment options complicated by epigastric pain consistent with his tumor. I refilled his Percocet today and discussed with him that we are happy to treat his pain and we can mail him prescriptions; however, if he wants/needs longterm opiate therapy from us we will have to see him about every few months. A lot is up in the air right now and he may consolidate his care up close to his home and if he does that he will either need to come and see us on occasion or find someone else up there who can help manage his pain. We talked about potential prognosis, end of life care, code status, hospice extensively today. He does have a health care directive which he will bring in the next time he comes in. I did not otherwise make any changes in his plan of care today.       They did have many questions about vitamins, herbal supplements and cannabidiol oil. I essentially said that we do not really know the role of any of those in treating cancer. He does qualify for the [place] Medical Cannabis Program for pain from his cancer but using the cannabidiol oil as an antineoplastic is not an approved indication in the [place] program. I tell them I caution all my patients again high dose vitamin therapy and antioxidants but otherwise it is not really clear to me if other supplements and herbs are dangerous or not. I hoped I answered their questions satisfactorily. I discussed my findings with Dr. Lomas in clinic today. I am happy to see him in followup at any time.           GARY GARCIA MD       Chart data reviewed today:    Allergies   Allergen Reactions     Hmg-Coa-R Inhibitors Rash, Hives and Itching          Medications:   I have reviewed this patient's medication profile.  MNPMP: reviewed      Data reviewed:  I reviewed recent labs and imaging, comments on pertinent findings: PET 3/14  IMPRESSION: In this  patient with history of tuberous sclerosis and  pancreatic adenocarcinoma:  1. Evidence of extensive metastatic disease includes extensive solid  and cavitary pulmonary nodular metastases, hilar and subcarinal  metastatic lymphadenopathy, retroperitoneal metastatic  lymphadenopathy, and a left sacral metastasis.  2. Ill-defined hypoenhancing hypermetabolic mass in the uncinate  process of the pancreas is compatible with the patient's biopsy-proven  adenocarcinoma. The mass encases the superior mesenteric artery and  abuts the superior mesenteric and splenic veins without encasement.  3. Indeterminate enhancing lesion in the right L4-L5 neural foramen.  Recommend MRI of the lumbar spine with and without contrast for  further evaluation.  4. Sequela of the patient's known history of tuberous sclerosis  including calcified cortical tubers and calcified subependymal  nodules.    Thank you for involving us in the patient's care.   Devon Gilbert MD / Palliative Medicine / Pager [phone] / [facility] Inpatient Team Consult Pager [phone] (answered 8am-430pm M-F) - ok to text page via [employer] / After-Hours Answering Service [phone] / Palliative Clinic in the [facility] - [phone] (scheduling); [phone] (triage).

## 2017-03-16 NOTE — MR AVS SNAPSHOT
After Visit Summary   3/16/2017    Paul Mathias Jennissen    MRN: [id number]           Patient Information     Date Of Birth          1945        Visit Information        Provider Department      3/16/2017 1:00 PM Lois Lomas MD [facility]        Today's Diagnoses     Malignant neoplasm of head of pancreas (H)    -  1    Bone lesion           Follow-ups after your visit        Your next 10 appointments already scheduled     Mar 21, 2017 11:00 AM CDT   (Arrive by 10:45 AM)   New Patient Visit with FERNANDA Garcia Pending sale to [facility] ([facility])    [address] [place] [address]  1st Floor  [facility] [address]   [phone]            Mar 27, 2017 11:00 AM CDT   (Arrive by 10:45 AM)   New Patient Visit with Konstantin Canales MD   [facility] ([facility])    [address] [place] [address]  2nd Floor  [facility] [id number] [phone]            Apr 05, 2017 11:00 AM CDT   Return Visit with Lois Lomas MD   [facility]    [address] [place] [id number] [phone]            Apr 18, 2017  2:15 PM CDT   New Visit with Payton Robison GC   [facility] ([facility]    [address] [place] [address] [phone]              Who to contact     If you have questions or need follow up information about today's clinic visit or your schedule please contact [facility] directly at [phone].  Normal or non-critical lab and imaging results will be communicated to you by MyChart, letter or phone within 4 business days after the clinic has received the results. If you do not hear from us within 7 days, please contact the clinic through MyChart or phone. If you have a critical or abnormal lab result, we will notify you by phone as soon as possible.  Submit  "refill requests through [employer] or call your pharmacy and they will forward the refill request to us. Please allow 3 business days for your refill to be completed.          Additional Information About Your Visit        [employer] Information     [employer] gives you secure access to your electronic health record. If you see a primary care provider, you can also send messages to your care team and make appointments. If you have questions, please call your primary care clinic.  If you do not have a primary care provider, please call [phone] and they will assist you.      [employer] is an electronic gateway that provides easy, online access to your medical records. With [employer], you can request a clinic appointment, read your test results, renew a prescription or communicate with your care team.     To access your existing account, please contact your [facility] or call [phone] for assistance.        Care EveryWhere ID     This is your Care EveryWhere ID. This could be used by other organizations to access your [place] medical records  [id number]        Your Vitals Were     Pulse Temperature Respirations Height Pulse Oximetry BMI (Body Mass Index)    80 97.4  F (36.3  C) (Oral) 20 1.72 m (5' 7.72\") 95% 27.44 kg/m2       Blood Pressure from Last 3 Encounters:   03/16/17 146/84   03/16/17 146/84   03/14/17 (!) 138/93    Weight from Last 3 Encounters:   03/16/17 81.2 kg (179 lb)   03/16/17 81.2 kg (179 lb)   03/14/17 81.6 kg (179 lb 14.4 oz)                 Where to get your medicines      Some of these will need a paper prescription and others can be bought over the counter.  Ask your nurse if you have questions.     Bring a paper prescription for each of these medications     oxyCODONE-acetaminophen 5-325 MG per tablet          Primary Care Provider Office Phone # Fax #    Allie Lomas [phone]       [facility] [address] [facility] [address]   "      Thank you!     Thank you for choosing [facility]  for your care. Our goal is always to provide you with excellent care. Hearing back from our patients is one way we can continue to improve our services. Please take a few minutes to complete the written survey that you may receive in the mail after your visit with us. Thank you!             Your Updated Medication List - Protect others around you: Learn how to safely use, store and throw away your medicines at www.disposemymeds.org.          This list is accurate as of: 3/16/17 11:59 PM.  Always use your most recent med list.                   Brand Name Dispense Instructions for use    calcium polycarbophil 625 MG tablet    FIBERCON     Take 2 tablets by mouth 2 times daily       DOCUSATE SODIUM PO      Take by mouth 2 times daily       DULCOLAX PO      Take 4-8 tablets by mouth daily       oxyCODONE-acetaminophen 5-325 MG per tablet    PERCOCET    180 tablet    Take 1-2 tablets by mouth every 4 hours as needed for pain       TEGRETOL PO      Take 200 mg by mouth 2 times daily       UNABLE TO FIND      Take 2 capsules by mouth daily [place]

## 2017-03-16 NOTE — PATIENT INSTRUCTIONS
Constipation: I recommended taking 2 of the dulcolax, twice a day: 4 tabs total a day.  You can increase that to 8 pills total a day if needed.    Remember to call at least a week in advance for refills of the Percocet.

## 2017-03-17 NOTE — PROGRESS NOTES
HISTORY OF PRESENT ILLNESS:  Paul M. Jennissen is a 71-year-old gentleman with newly diagnosed pancreatic cancer, who presents to clinic to follow up on test results and discuss next steps.  On the patient's previous visit, we had switched him over to oxycodone; however, this was not controlling his pain, and required going back on the Percocet.  He did meet with Palliative Care this morning.      The patient also had a PET scan on March 14, 2017, which showed extensive metastatic disease including extensive solid and cavitary pulmonary nodule metastases, hilar and subcarinal metastatic lymphadenopathy, retroperitoneal metastatic lymphadenopathy, and left sacral metastases.  Ill-defined hypo-enhancing hypermetabolic mass in the uncinate process of the pancreas, compatible with the patient's biopsy-proven adenocarcinoma.  Mass encases the superior mesenteric artery, and abuts the superior mesenteric and splenic veins without encasement.  Indeterminate enhancing lesion in the right L4-L5 neural foramen.  Recommended MRI.  Sequelae of the patient's known history of tuberous sclerosis, and included calcified cortical tubers and calcified subependymal nodules.      MRI of the brain from March 14, 2017, showed no evidence of enhancing lesions, i.e., metastases intracranially on current exam.  At least 6-7 susceptibly dark foci in the subcortical regions, but lacking enhancement; likely his tuberous sclerosis.  FLAIR hyperintense foci scattered throughout, predominantly subcortical, and to a lesser degree subcortical cerebrum, most prominent in the high posterior frontal and parietal lobes.  These are indeterminate for acuity.  Differential includes acute encephalopathic syndrome versus tuberous sclerosis.  Given his history, tuberous sclerosis is more likely.      The patient did also met with Dr. Hernandez Maldonado from Pulmonary.  Dr. Maldonado felt that the lung lesions were most likely metastatic disease.      On today's  visit, I discussed with the patient, his wife and daughter, the findings.  I reviewed that Dr. Maldonado's impression was that the pulmonary lesions are most likely metastatic, which would make him stage IV.  However, I do think it is important that we do a formal biopsy to confirm this is metastatic disease since this would affect his prognosis and treatment recommendations.  My care coordinator, David Mcgee will help coordinate EBUS with Dr. Del Rosario for next week, and will be in contact with the Dr. Del Rosario's coordinator, Cha Tony.  The patient's wife had a number of questions regarding genetics, since the patient has TS, as well as their son and daughter and grandson.  Offered to meet with our genetics team, which they thought would be helpful.      I did also review the lumbar lesion that was seen on most recent PET scan, and recommended MRI.      PLAN:   1.  EBUS with biopsy of pulmonary lesion.   2.  Lumbar MRI.   3.  Genetics referral.   4.  Return to clinic after both tests completed to finalize plan.      The patient's case has been discussed with Dr. Maldonado.  Will plan to present tomorrow at Pulmonary Conference.      On today's visit, we did also briefly discuss prognosis if confirmed to be metastatic pancreatic cancer.  The patient is very clear that quality of life is the most important, and we concurred, and explained that the foundation of any decision will be based on his quality of life.      TOTAL TIME SPENT:  15 minutes, greater than 50% in counseling and coordinating care.         RIKKI LOMAS MD             D: 2017 13:49   T: 2017 02:40   MT: [id number]      Name:     JENNISSEN, PAUL   MRN:      [id number]        Account:      [id number]   :      1945           Visit Date:   2017      Document: [id number]       cc: Allie Lomas MD

## 2017-03-17 NOTE — PROGRESS NOTES
SUBJECTIVE:  Mr. Jennissen is seen with his wife and daughter today who supplement the history.  I also reviewed the chart and some key details with him and spoke with Dr. Lomas today about his situation.  I will not elaborate the full details here but briefly, he has a longstanding history of tuberous sclerosis and well controlled seizure disorder on carbamazepine.  He was diagnosed with pancreatic cancer and there has been some discussion about its actual stage because he has multiple TS lesions throughout his body.  Nonetheless, he recently had a PET scan and it seems apparent he has widespread metastatic cancer including lung and bone lesions.  He met with Dr. Maldonado about whether he should get a lung biopsy today around that and he is also taking with Dr. Lomas to discuss PET scan results and what to do.  When I met with him he appeared to have an awareness he has metastatic cancer and in fact tells me that he and his family have been talking about what to do and while he would like to have more time and good quality of life he is not really at all willing to sacrifice quality of life just for more time.  He is looking forward to talking with Dr. Lomas about that.  He would tolerate mild side effects from some treatment but really would have a high threshold for side effects in order for him to continue chemotherapy.  In that context, his wife is a retired nurse and what sounds like a nurse executive who started a couple of hospice agencies up in the [place] area where they live and they are very familiar with hospice and end of life care.  He says his family is supportive of whatever decisions he would make.  We talked about hospice and what it would look like for him.  All that discussion was in the context of him yet speaking with Dr. Lomas about the PET scan results and what she could recommend to him and it certainly was premature to make any hospice decisions today to say otherwise.        Pain wise  he is doing okay.  He had been on Percocet and then plain oxycodone but it turns out Percocet was much more effective with him.  He is taking 2 tabs at a time b.i.d. or t.i.d. with really excellent results and no side effects apart from constipation.  He is now taking over the counter oral bisacodyl 3 tablets a day and docusate and that is helping.  Besides that, no side effects.  He is worried about getting refills because his primary care doctor close to home is actually semiretired and they are not sure how reliable that will be.        Otherwise he is doing well symptomatically.  His major symptoms have been weight loss and anorexia and some fatigue but he remains independent in his ADLs and quite active.  He runs 5Ks all the time and is thinking about doing a slow run/walk and asked me if he can exercise (he can).  He has several children, 2 of whom have tuberous sclerosis and some grandchildren and all of them are in pretty good health.  He looks forward to spending time with them and wants to live with dignity and die peacefully.        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  He is 1 of 14 children and has many siblings and nieces and nephews in the area.  He continues to run and exercise and lift weights.        REVIEW OF SYMPTOMS:  Comprehensive review of systems is negative except as described above.        PHYSICAL EXAMINATION:  Vitals noted.  Alert, pleasant, well developed older man in no acute distress.  Head:  NC/AT.  Eyes anicteric without injection.  Mouth pink, moist, no lesions.  Neck supple, no adenopathy.  Lungs unlabored, clear to auscultation bilaterally.  CV:  Regular rate, rhythm, S1, S2.  Abdomen is soft, nontender and nondistended.  Most of his pain is in his epigastrium and I did not notice anything there.  Lower extremities are without clubbing, cyanosis or edema.  Neuropsychological exam is normal.        IMPRESSION/RECOMMENDATIONS:  A  71-year-old man with metastatic pancreatic cancer contemplating treatment options complicated by epigastric pain consistent with his tumor.  I refilled his Percocet today and discussed with him that we are happy to treat his pain and we can mail him prescriptions; however, if he wants/needs longterm opiate therapy from us we will have to see him about every few months.  A lot is up in the air right now and he may consolidate his care up close to his home and if he does that he will either need to come and see us on occasion or find someone else up there who can help manage his pain.  We talked about potential prognosis, end of life care, code status, hospice extensively today.  He does have a health care directive which he will bring in the next time he comes in.  I did not otherwise make any changes in his plan of care today.        They did have many questions about vitamins, herbal supplements and cannabidiol oil.  I essentially said that we do not really know the role of any of those in treating cancer.  He does qualify for the [place] Medical Cannabis Program for pain from his cancer but using the cannabidiol oil as an antineoplastic is not an approved indication in the [place] program.  I tell them I caution all my patients again high dose vitamin therapy and antioxidants but otherwise it is not really clear to me if other supplements and herbs are dangerous or not.  I hoped I answered their questions satisfactorily.  I discussed my findings with Dr. Lomas in clinic today.  I am happy to see him in followup at any time.         GARY GARCIA MD             D: 2017 17:04   T: 2017 10:34   MT: [id number]      Name:     JENNISSEN, PAUL   MRN:      [id number]        Account:      [id number]   :      1945           Service Date: 2017      Document: [id number]

## 2017-03-17 NOTE — PROGRESS NOTES
I spoke to patient and his wife, telling them that we discussed his images and case this morning.   The recommendation was to have him meet Interventional Radiology to discuss a CT guided needle biopsy.   I arranged for this consultation for Tues., 3/21 with ANPU Gallo.   They were anxious to get biopsy done as soon as possible and hoped to have it same day.   They then told me they planned to fly to [facility] 3/29 thru 4/3 for a fun race activity.   I checked with Dr. Lamas about any flying restrictions following a needle biopsy of lung and he suggested at least 1-2 weeks.   To avoid ruining their travel plans, it would be best to arrange the biopsy after their return on 4/3.   I will email them details of IR appt, asked them to call if questions.

## 2017-03-17 NOTE — TELEPHONE ENCOUNTER
Per visit Disposition on 03/16/17 this pt needs a MRI scheduled. This pt has an appointment for the MRI closer to home at [facility] on 03/22/2017 at 8:45.

## 2017-03-21 PROBLEM — R91.1 LESION OF LUNG: Status: ACTIVE | Noted: 2017-01-01

## 2017-03-21 NOTE — PATIENT INSTRUCTIONS
You are scheduled for your biopsy on Wednesday, April 5, 2017   Report to the [facility] Waiting room at 9:30 AM  The [facility] Waiting Room is located on the 2nd floor (street level) of the [address]  Your procedure is scheduled to start at approximately 11:00 AM    No solid foods or milk products for 6 hours prior on the day of the procedure. 5:00 AM  You may have clear liquids until 2 hours prior on the day of the procedure.(water, apple juice, broth, coffee or pallavi without milk or sugar, jell-o, white grape juice) 9:00 AM    You will need a     If you have any questions you may call the Radiology Nurse Line [phone]

## 2017-03-21 NOTE — PROGRESS NOTES
First Name: Mandeep   Age: 71 year old   Referring Physician: Dr. 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  He has PEt avid lung lesions and a biopsy is requested.  Plan:  Image guided biopsy of right lower lobe lung lesion  Blood work today  Will schedule biopsy for when he returns from his vacation.    HPI:  This is a patient with a PMH of CAD with stent placement in 2001, hypertension, hypercholesterolemia, KAROLINA uses CPAP, tuberous sclerosis which was diagnosed at age 33 after he had a seizure and the imaging showed a lesion in the brain.  Recently he developed low back pain, different from his usual low back pain, so he went to see his physician who ordered a CT scanof the abdomen which reveal a lesion on the head of the pancreas.  This was biopsied and revealed adenocarcinoma.  The patient had a PET CT scan which showed:  IMPRESSION: In this patient with history of tuberous sclerosis and  pancreatic adenocarcinoma:  1. Evidence of extensive metastatic disease includes extensive solid and cavitary pulmonary nodular metastases, hilar and subcarinal metastatic lymphadenopathy, retroperitoneal metastatic lymphadenopathy, and a left sacral metastasis.  2. Ill-defined hypoenhancing hypermetabolic mass in the uncinate process of the pancreas is compatible with the patient's biopsy-proven adenocarcinoma. The mass encases the superior mesenteric artery and abuts the superior mesenteric and splenic veins without encasement.  3. Indeterminate enhancing lesion in the right L4-L5 neural foramen. Recommend MRI of the lumbar spine with and without contrast for further evaluation.  4. Sequela of the patient's known history of tuberous sclerosis including calcified cortical tubers and calcified subependymal nodules.  A biopsy is requested of  one of the lung lesions.  Dr. Montoya and Dr. Arrieta form Interventional Radiology reviewed the imaging approved biopsy of the right lower lobe lung lesion.  The patient and his wife have a vacation planned to [place] from March 29 through April 3.       PAST MEDICAL HISTORY:   Past Medical History   Diagnosis Date     Colon polyps      hyperplastic     Coronary artery disease      Hypercholesterolemia      Hypertension      KAROLINA (obstructive sleep apnea)      uses CPAP     Seizure disorder (H)      Tuberous sclerosis (H)      lesion in the brain     Type 2 diabetes mellitus (H)      PAST SURGICAL HISTORY:   Past Surgical History   Procedure Laterality Date     Coronary artery stent placement  2001     FAMILY HISTORY:   Family History   Problem Relation Age of Onset     Other - See Comments Mother 94     old age     Myocardial Infarction Father 88     Breast Cancer Sister 71     with mets     Coronary Artery Disease Brother 73     CANCER Brother      Myocardial Infarction Maternal Grandfather      Colon Cancer Sister 52     with mets     DIABETES Sister      Hypertension Sister      Arrhythmia Sister      DIABETES Brother      Hypertension Brother      Hyperlipidemia Brother      Tuberous Sclerosis Complex Son      brain lesion     Seizure Disorder Son      Tuberous Sclerosis Complex Daughter      brain lesion     Seizure Disorder Daughter      SOCIAL HISTORY:   Social History   Substance Use Topics     Smoking status: Never Smoker     Smokeless tobacco: Never Used     Alcohol use Yes      Comment: 2-3 beers a week     PROBLEM LIST:   Patient Active Problem List    Diagnosis Date Noted     Lesion of lung 03/21/2017     Priority: Medium     Malignant neoplasm of head of pancreas (H) 03/09/2017     Priority: Medium     MEDICATIONS:   Prescription Medications as of 3/21/2017             oxyCODONE-acetaminophen (PERCOCET) 5-325 MG per tablet Take 1-2 tablets by mouth every 4 hours as needed for pain    Bisacodyl  (DULCOLAX PO) Take 4-8 tablets by mouth daily    UNABLE TO FIND Take 2 capsules by mouth daily Wheat Grass    CarBAMazepine (TEGRETOL PO) Take 200 mg by mouth 2 times daily    calcium polycarbophil (FIBERCON) 625 MG tablet Take 2 tablets by mouth 2 times daily     DOCUSATE SODIUM PO Take by mouth 2 times daily Reported on 3/21/2017        ALLERGIES: Hmg-coa-r inhibitors  VITALS: /78  Pulse 83  Wt 79.4 kg (175 lb)  SpO2 97%  BMI 26.83 kg/m2    ROS:  Answers for HPI/ROS submitted by the patient on 3/20/2017   General Symptoms: No  Skin Symptoms: No  HENT Symptoms: No  EYE SYMPTOMS: No  HEART SYMPTOMS: No  LUNG SYMPTOMS: No  INTESTINAL SYMPTOMS: No  URINARY SYMPTOMS: No  REPRODUCTIVE SYMPTOMS: No  SKELETAL SYMPTOMS: No  BLOOD SYMPTOMS: No  NERVOUS SYSTEM SYMPTOMS: No  MENTAL HEALTH SYMPTOMS: No    Physical Examination:  Vital signs are reviewed and they are stable  Constitutional: Pleasant, older gentleman, in no acute physical distress, came with his wifeDina to the appt  Cardiovascular: negative, RRR  Respiratory: negative findings: normal respiratory rate and rhythm, lungs clear to auscultation  Musculoskeletal: extremities normal- no gross deformities noted, gait normal and normal muscle tone  Skin: no suspicious lesions or rashes  Neurologic: negative  Psychiatric: affect normal/bright and mentation appears normal.    BMP RESULTS:  Lab Results   Component Value Date    CR 0.70 03/21/2017    GFRESTIMATED >90  Non  GFR Calc   03/21/2017    GFRESTBLACK >90   GFR Calc   03/21/2017        CBC RESULTS:  Lab Results   Component Value Date    HGB 14.4 03/21/2017    HCT 41.9 03/21/2017     03/21/2017       INR/PTT:  Lab Results   Component Value Date    INR 0.93 03/21/2017       Diagnostic studies: see PET CT scan from 3/14/17    PROVIDER NOTE:  I explained the procedure to Mandeep and his wife, Dina.  This included:  Preparing for the procedure, the actual procedure and  recovery.  I explained the risk of the lung biopsy:  pneumothorax, hemoptysis, possible need for a chest tube and overnight stay in the hospital, bleeding from the lung requiring an embolization procedure and death from the procedure.  I explained that usually the results return after three to four business days.    I explained that they would be contacted by a nurse coordinator following this to determine a future plan.  Thank you for involving us in the care of your patient.    CC  Patient Care Team:  Allie Lomas as PCP - General (Family Practice)  Lois Lomas MD as MD (Oncology)  David Mcgee, RN as Nurse Coordinator (Oncology)  LOIS LOMAS

## 2017-03-21 NOTE — LETTER
3/21/2017       RE: Paul Mathias Jennissen  [address] [address] [address]     Dear Colleague,    Thank you for referring your patient, Paul Mathias Jennissen, to the [facility] INTERVENTIONAL RADIOLOGY at [facility]. Please see a copy of my visit note below.    First Name: Mandeep   Age: 71 year old   Referring Physician: Dr. 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  He has PEt avid lung lesions and a biopsy is requested.  Plan:  Image guided biopsy of right lower lobe lung lesion  Blood work today  Will schedule biopsy for when he returns from his vacation.    HPI:  This is a patient with a PMH of CAD with stent placement in 2001, hypertension, hypercholesterolemia, KAROLINA uses CPAP, tuberous sclerosis which was diagnosed at age 33 after he had a seizure and the imaging showed a lesion in the brain.  Recently he developed low back pain, different from his usual low back pain, so he went to see his physician who ordered a CT scanof the abdomen which reveal a lesion on the head of the pancreas.  This was biopsied and revealed adenocarcinoma.  The patient had a PET CT scan which showed:  IMPRESSION: In this patient with history of tuberous sclerosis and  pancreatic adenocarcinoma:  1. Evidence of extensive metastatic disease includes extensive solid and cavitary pulmonary nodular metastases, hilar and subcarinal metastatic lymphadenopathy, retroperitoneal metastatic lymphadenopathy, and a left sacral metastasis.  2. Ill-defined hypoenhancing hypermetabolic mass in the uncinate process of the pancreas is compatible with the patient's biopsy-proven adenocarcinoma. The mass encases the superior mesenteric artery and abuts the superior mesenteric and splenic veins without encasement.  3.  Indeterminate enhancing lesion in the right L4-L5 neural foramen. Recommend MRI of the lumbar spine with and without contrast for further evaluation.  4. Sequela of the patient's known history of tuberous sclerosis including calcified cortical tubers and calcified subependymal nodules.  A biopsy is requested of one of the lung lesions.  Dr. Montoya and Dr. Meenakshi hitchcock Interventional Radiology reviewed the imaging approved biopsy of the right lower lobe lung lesion.  The patient and his wife have a vacation planned to [place] from March 29 through April 3.       PAST MEDICAL HISTORY:   Past Medical History   Diagnosis Date     Colon polyps      hyperplastic     Coronary artery disease      Hypercholesterolemia      Hypertension      KAROLINA (obstructive sleep apnea)      uses CPAP     Seizure disorder (H)      Tuberous sclerosis (H)      lesion in the brain     Type 2 diabetes mellitus (H)      PAST SURGICAL HISTORY:   Past Surgical History   Procedure Laterality Date     Coronary artery stent placement  2001     FAMILY HISTORY:   Family History   Problem Relation Age of Onset     Other - See Comments Mother 94     old age     Myocardial Infarction Father 88     Breast Cancer Sister 71     with mets     Coronary Artery Disease Brother 73     CANCER Brother      Myocardial Infarction Maternal Grandfather      Colon Cancer Sister 52     with mets     DIABETES Sister      Hypertension Sister      Arrhythmia Sister      DIABETES Brother      Hypertension Brother      Hyperlipidemia Brother      Tuberous Sclerosis Complex Son      brain lesion     Seizure Disorder Son      Tuberous Sclerosis Complex Daughter      brain lesion     Seizure Disorder Daughter      SOCIAL HISTORY:   Social History   Substance Use Topics     Smoking status: Never Smoker     Smokeless tobacco: Never Used     Alcohol use Yes      Comment: 2-3 beers a week     PROBLEM LIST:   Patient Active Problem List    Diagnosis Date Noted     Lesion of  lung 03/21/2017     Priority: Medium     Malignant neoplasm of head of pancreas (H) 03/09/2017     Priority: Medium     MEDICATIONS:   Prescription Medications as of 3/21/2017             oxyCODONE-acetaminophen (PERCOCET) 5-325 MG per tablet Take 1-2 tablets by mouth every 4 hours as needed for pain    Bisacodyl (DULCOLAX PO) Take 4-8 tablets by mouth daily    UNABLE TO FIND Take 2 capsules by mouth daily Wheat Grass    CarBAMazepine (TEGRETOL PO) Take 200 mg by mouth 2 times daily    calcium polycarbophil (FIBERCON) 625 MG tablet Take 2 tablets by mouth 2 times daily     DOCUSATE SODIUM PO Take by mouth 2 times daily Reported on 3/21/2017        ALLERGIES: Hmg-coa-r inhibitors  VITALS: /78  Pulse 83  Wt 79.4 kg (175 lb)  SpO2 97%  BMI 26.83 kg/m2    ROS:  Answers for HPI/ROS submitted by the patient on 3/20/2017   General Symptoms: No  Skin Symptoms: No  HENT Symptoms: No  EYE SYMPTOMS: No  HEART SYMPTOMS: No  LUNG SYMPTOMS: No  INTESTINAL SYMPTOMS: No  URINARY SYMPTOMS: No  REPRODUCTIVE SYMPTOMS: No  SKELETAL SYMPTOMS: No  BLOOD SYMPTOMS: No  NERVOUS SYSTEM SYMPTOMS: No  MENTAL HEALTH SYMPTOMS: No    Physical Examination:  Vital signs are reviewed and they are stable  Constitutional: Pleasant, older gentleman, in no acute physical distress, came with his wifeDina to the appt  Cardiovascular: negative, RRR  Respiratory: negative findings: normal respiratory rate and rhythm, lungs clear to auscultation  Musculoskeletal: extremities normal- no gross deformities noted, gait normal and normal muscle tone  Skin: no suspicious lesions or rashes  Neurologic: negative  Psychiatric: affect normal/bright and mentation appears normal.    BMP RESULTS:  Lab Results   Component Value Date    CR 0.70 03/21/2017    GFRESTIMATED >90  Non  GFR Calc   03/21/2017    GFRESTBLACK >90   GFR Calc   03/21/2017        CBC RESULTS:  Lab Results   Component Value Date    HGB 14.4 03/21/2017    HCT  41.9 03/21/2017     03/21/2017       INR/PTT:  Lab Results   Component Value Date    INR 0.93 03/21/2017       Diagnostic studies: see PET CT scan from 3/14/17    PROVIDER NOTE:  I explained the procedure to Mandeep and his wife, Dina.  This included:  Preparing for the procedure, the actual procedure and recovery.  I explained the risk of the lung biopsy:  pneumothorax, hemoptysis, possible need for a chest tube and overnight stay in the hospital, bleeding from the lung requiring an embolization procedure and death from the procedure.  I explained that usually the results return after three to four business days.    I explained that they would be contacted by a nurse coordinator following this to determine a future plan.  Thank you for involving us in the care of your patient.    CC  Patient Care Team:  Allie Lomas as PCP - General (Family Practice)  Rikki Lomas MD as MD (Oncology)  David Mcgee, RN as Nurse Coordinator (Oncology)  RIKKI LOMAS    Again, thank you for allowing me to participate in the care of your patient.      Sincerely,    FERNANDA Orourke CNS

## 2017-03-21 NOTE — MR AVS SNAPSHOT
After Visit Summary   3/21/2017    Paul Mathias Jennissen    MRN: [id number]           Patient Information     Date Of Birth          1945        Visit Information        Provider Department      3/21/2017 11:00 AM Julissa Friedman APRN CNS [facility] Interventional Radiology        Today's Diagnoses     Lesion of lung    -  1    Malignant neoplasm of head of pancreas (H)           Care Instructions    You are scheduled for your biopsy on Wednesday, April 5, 2017   Report to the [facility] Waiting room at 9:30 AM  The [facility] Waiting Room is located on the 2nd floor (street level) of [address]  Your procedure is scheduled to start at approximately 11:00 AM    No solid foods or milk products for 6 hours prior on the day of the procedure. 5:00 AM  You may have clear liquids until 2 hours prior on the day of the procedure.(water, apple juice, broth, coffee or pallavi without milk or sugar, jell-o, white grape juice) 9:00 AM    You will need a     If you have any questions you may call the Radiology Nurse Line [phone]        Follow-ups after your visit        Your next 10 appointments already scheduled     Apr 18, 2017  2:15 PM CDT   New Visit with Payton Robison GC   [facility]    [address] [place] [id number] [phone]              Future tests that were ordered for you today     Open Future Orders        Priority Expected Expires Ordered    Hemoglobin and hematocrit Routine  3/21/2018 3/21/2017    Platelet count Routine  3/21/2018 3/21/2017    INR Routine  3/21/2018 3/21/2017    CT Lung Mediastinum Biopsy Routine 4/3/2017 5/5/2017 3/21/2017            Who to contact     Please call your clinic at [phone] to:    Ask questions about your health    Make or cancel appointments    Discuss your medicines    Learn about your test results    Speak to your doctor   If you have compliments or concerns  about an experience at your clinic, or if you wish to file a complaint, please contact [facility] Patient Relations at [phone] or email us at [email or web address]         Additional Information About Your Visit        [employer] Information     [employer] gives you secure access to your electronic health record. If you see a primary care provider, you can also send messages to your care team and make appointments. If you have questions, please call your primary care clinic.  If you do not have a primary care provider, please call [phone] and they will assist you.      [employer] is an electronic gateway that provides easy, online access to your medical records. With [employer], you can request a clinic appointment, read your test results, renew a prescription or communicate with your care team.     To access your existing account, please contact your [facility] or call [phone] for assistance.        Care EveryWhere ID     This is your Care EveryWhere ID. This could be used by other organizations to access your [facility] medical records  [id number]        Your Vitals Were     Pulse Pulse Oximetry BMI (Body Mass Index)             83 97% 26.83 kg/m2          Blood Pressure from Last 3 Encounters:   03/21/17 128/78   03/16/17 146/84   03/16/17 146/84    Weight from Last 3 Encounters:   03/21/17 79.4 kg (175 lb)   03/16/17 81.2 kg (179 lb)   03/16/17 81.2 kg (179 lb)               Primary Care Provider Office Phone # Fax #    Allie JONES Abebe [phone]       [address] [address]        Thank you!     Thank you for choosing [facility] INTERVENTIONAL RADIOLOGY  for your care. Our goal is always to provide you with excellent care. Hearing back from our patients is one way we can continue to improve our services. Please take a few minutes to complete the written survey that you may receive in the  mail after your visit with us. Thank you!             Your Updated Medication List - Protect others around you: Learn how to safely use, store and throw away your medicines at www.disposemymeds.org.          This list is accurate as of: 3/21/17 11:56 AM.  Always use your most recent med list.                   Brand Name Dispense Instructions for use    calcium polycarbophil 625 MG tablet    FIBERCON     Take 2 tablets by mouth 2 times daily       DOCUSATE SODIUM PO      Take by mouth 2 times daily Reported on 3/21/2017       DULCOLAX PO      Take 4-8 tablets by mouth daily       oxyCODONE-acetaminophen 5-325 MG per tablet    PERCOCET    180 tablet    Take 1-2 tablets by mouth every 4 hours as needed for pain       TEGRETOL PO      Take 200 mg by mouth 2 times daily       UNABLE TO FIND      Take 2 capsules by mouth daily Wheat Grass

## 2017-03-23 NOTE — PROGRESS NOTES
Received 3/22/17 MRI Lumbar Spine report via fax from [facility].  MRI images pushed to PACS.  Archiving request faxed to [facility] at [phone].  MRI report labeled for EMR scanning, copy given to Dr. Lomas for upcoming appointment.    David Mcgee RN, BSN, OCN

## 2017-03-27 NOTE — TELEPHONE ENCOUNTER
Mandeep -    I would like to start a long acting opioid for Mandeep given that he is needing 10 tabs of Percocet per day.  I'll start with MS Contin 15mg twice daily.  When you call him about this, could you just verify if he has ever had an adverse reaction to morphine?  If so, I will change it to Oxycontin 10mg BID.     Thanks!  Sharon

## 2017-03-27 NOTE — TELEPHONE ENCOUNTER
Last refill: 03/13   Last office visit: 03/16   Scheduled for follow up     Patient would like script mailed to home     [place]  Report reviewed.

## 2017-03-28 NOTE — TELEPHONE ENCOUNTER
Patients wife called stating that her family MD was able to write a weeks script for the newly prescribed MS Contin 15mgs BID. She would write a prescription for a weeks supply that would cover Jere' vacation in [place], I informed her that that plan was agreeable to  and that a 30 day  Prescription was mailed to their address this morning. Dr Young'  order was mailed to Apoorva QUEZADA at the office of Dr. Lomas.

## 2017-04-05 NOTE — TELEPHONE ENCOUNTER
I received a message from Dr. Lomas's office.  Mandeep had an MRI of the lumbar spine and he has mets in L3, S4-5.  Dr. Lomas preferred to cancel the lung biopsy and perform a bone biopsy.  The images were reviewed by neuroradiology and approved by Dr. Emerson.    The patient has been scheduled for Apri 6.  He will arrive at 9:30 AM and the biopsy should be about 11:00 AM.  He and his wife were also given NPO instructions.  Message sent to Dr. Lomas's office.    Julissa Friedman, CNS  Interventional Radiology

## 2017-04-06 NOTE — PROGRESS NOTES
Interventional Radiology Intra-procedural Nursing Note    Patient Name: Paul Mathias Jennissen  Medical Record Number: [id number]  Today's Date: 2017    Start Time: 1340  End of procedure time: 1440  Procedure: C T Guided Sacral Mass Biopsy  Report given to: 2 A RN  Time pt departs:  1450  : No  Other Notes: Escortedfrom 2 A after ID et  verbally v erified by pt. To CT for  CT guided biopsy of left sacral bone  Lesion. Pt has recently been diagnosed w/ Cancer in head of  Pancreas w/ Mets to Lung  And Bone here to have new lesion of left sacrum biopsied. Significant pressure et strength of 2 doctors needed to get Biopsy gun where it will get good tissue samples.Pathologist here to evaluate samples of tissue obtained.4 cores obtained in total et given to Pathologist for  Studies. Tolerated procedure well w/ incremental dose of sed. meds.    Elizabeth M. Agerbeck

## 2017-04-06 NOTE — IP AVS SNAPSHOT
[address] [address]                                       After Visit Summary   4/6/2017    Paul Mathias Jennissen    MRN: [id number]           After Visit Summary Signature Page     I have received my discharge instructions, and my questions have been answered. I have discussed any challenges I see with this plan with the nurse or doctor.    ..........................................................................................................................................  Patient/Patient Representative Signature      ..........................................................................................................................................  Patient Representative Print Name and Relationship to Patient    ..................................................               ................................................  Date                                            Time    ..........................................................................................................................................  Reviewed by Signature/Title    ...................................................              ..............................................  Date                                                            Time

## 2017-04-06 NOTE — IP AVS SNAPSHOT
MRN:[id number]                      After Visit Summary   4/6/2017    Paul Mathias Jennissen    MRN: [id number]           Visit Information        Department      4/6/2017  8:58 AM [address] [facility] [place]          Review of your medicines      UNREVIEWED medicines. Ask your doctor about these medicines        Dose / Directions    calcium polycarbophil 625 MG tablet   Commonly known as:  FIBERCON        Dose:  2 tablet   Take 2 tablets by mouth 2 times daily   Refills:  0       DOCUSATE SODIUM PO        Take by mouth 2 times daily Reported on 3/21/2017   Refills:  0       DULCOLAX PO        Dose:  4-8 tablet   Take 4-8 tablets by mouth daily   Refills:  0       morphine 15 MG 12 hr tablet   Commonly known as:  MS CONTIN   Used for:  Neoplasm related pain        Dose:  15 mg   Take 1 tablet (15 mg) by mouth every 12 hours maximum 2 tablet(s) per day   Quantity:  60 tablet   Refills:  0       oxyCODONE-acetaminophen 5-325 MG per tablet   Commonly known as:  PERCOCET   Used for:  Neoplasm related pain        Dose:  1-2 tablet   Take 1-2 tablets by mouth every 4 hours as needed for pain   Quantity:  270 tablet   Refills:  0       TEGRETOL PO        Dose:  200 mg   Take 200 mg by mouth 2 times daily   Refills:  0       UNABLE TO FIND        Dose:  2 capsule   Take 2 capsules by mouth daily Wheat Grass   Refills:  0                Protect others around you: Learn how to safely use, store and throw away your medicines at www.disposemymeds.org.         Follow-ups after your visit        Your next 10 appointments already scheduled     Apr 13, 2017 12:00 PM CDT   Return Visit with Lois Lomas MD   [facility])    [id number] [place] [id number] [phone]            Apr 18, 2017  2:15 PM CDT   New Visit with Payton Robison GC   [facility])    [address] [place] [id number]    [phone]               Care Instructions        Further instructions from your care team       [facility]    Interventional Radiology  Patient Instructions Following Biopsy    AFTER YOU GO HOME  ? If you were given sedation DO NOT drive or operate machinery at home or at work for at least 24 hours  ? DO relax and take it easy for 48 hours, no strenuous activity for 24 hours  ? DO drink plenty of fluids  ? DO resume your regular diet, unless otherwise instructed by your Primary Physician  ? Keep the dressing dry and in place for 24 hours.  ? DO NOT drink alcoholic beverages the day of your procedure  ? DO NOT do any strenuous exercise or lifting (> 10 lbs) for at least 7 days following your procedure  ? DO NOT take a bath or shower for at least 12 hours following your procedure  ? Remove dressing after shower the next day. Replace with Band aid for 2 days.  Never leave a wet dressing in place.  ? DO NOT make any important or legal decisions for 24 hours following your procedure  ? There should be minimum drainage from the biopsy site    CALL THE PHYSICIAN IF:  ? You start bleeding from the procedure site.  If you do start to bleed from that site, lie down flat and hold pressure on the site for a minimum of 10 minutes.  Your physician will tell you if you need to return to the hospital  ? You develop nausea or vomiting  ? You have excessive swelling, redness, or tenderness at the site  ? You have drainage that looks like it is infected.  ? You experience severe pain  ? You develop hives or a rash or unexplained itching  ? You develop shortness of breath  ? You develop a temperature of 101 degrees F or greater        [facility] INTERVENTIONAL RADIOLOGY DEPARTMENT  Procedure Physician: Dr. Manzanares                 Date of procedure: April 6, 2017  Telephone Numbers: [phone] Monday-Friday 8:00 am to 4:30 pm  [phone] After 4:30 pm Monday-Friday, Weekends & Holidays.   Ask for the  Interventional Radiologist on call.  Someone is on call 24 hrs/day  [facility] toll free number: [phone] Monday-Friday 8:00 am to 4:30 pm  [facility] Emergency Dept: [phone]           Additional Information About Your Visit        MyChart Information     [employer] gives you secure access to your electronic health record. If you see a primary care provider, you can also send messages to your care team and make appointments. If you have questions, please call your primary care clinic.  If you do not have a primary care provider, please call [phone] and they will assist you.        Care EveryWhere ID     This is your Care EveryWhere ID. This could be used by other organizations to access your [facility] medical records  [id number]        Your Vitals Were     Blood Pressure Pulse Temperature Pulse Oximetry          125/80 81 98.1  F (36.7  C) 95%         Primary Care Provider Office Phone # Fax #    Allie Lomas [phone]      Thank you!     Thank you for choosing [facility] for your care. Our goal is always to provide you with excellent care. Hearing back from our patients is one way we can continue to improve our services. Please take a few minutes to complete the written survey that you may receive in the mail after you visit with us. Thank you!             Medication List: This is a list of all your medications and when to take them. Check marks below indicate your daily home schedule. Keep this list as a reference.      Medications           Morning Afternoon Evening Bedtime As Needed    calcium polycarbophil 625 MG tablet   Commonly known as:  FIBERCON   Take 2 tablets by mouth 2 times daily                                DOCUSATE SODIUM PO   Take by mouth 2 times daily Reported on 3/21/2017                                DULCOLAX PO   Take 4-8 tablets by mouth daily                                morphine 15 MG 12 hr tablet   Commonly known as:  MS CONTIN   Take 1 tablet (15 mg) by mouth every  12 hours maximum 2 tablet(s) per day                                oxyCODONE-acetaminophen 5-325 MG per tablet   Commonly known as:  PERCOCET   Take 1-2 tablets by mouth every 4 hours as needed for pain                                TEGRETOL PO   Take 200 mg by mouth 2 times daily                                UNABLE TO FIND   Take 2 capsules by mouth daily Wheat Grass

## 2017-04-06 NOTE — DISCHARGE INSTRUCTIONS
[facility]    Interventional Radiology  Patient Instructions Following Biopsy    AFTER YOU GO HOME  ? If you were given sedation DO NOT drive or operate machinery at home or at work for at least 24 hours  ? DO relax and take it easy for 48 hours, no strenuous activity for 24 hours  ? DO drink plenty of fluids  ? DO resume your regular diet, unless otherwise instructed by your Primary Physician  ? Keep the dressing dry and in place for 24 hours.  ? DO NOT drink alcoholic beverages the day of your procedure  ? DO NOT do any strenuous exercise or lifting (> 10 lbs) for at least 7 days following your procedure  ? DO NOT take a bath or shower for at least 12 hours following your procedure  ? Remove dressing after shower the next day. Replace with Band aid for 2 days.  Never leave a wet dressing in place.  ? DO NOT make any important or legal decisions for 24 hours following your procedure  ? There should be minimum drainage from the biopsy site    CALL THE PHYSICIAN IF:  ? You start bleeding from the procedure site.  If you do start to bleed from that site, lie down flat and hold pressure on the site for a minimum of 10 minutes.  Your physician will tell you if you need to return to the hospital  ? You develop nausea or vomiting  ? You have excessive swelling, redness, or tenderness at the site  ? You have drainage that looks like it is infected.  ? You experience severe pain  ? You develop hives or a rash or unexplained itching  ? You develop shortness of breath  ? You develop a temperature of 101 degrees F or greater        [facility] INTERVENTIONAL RADIOLOGY DEPARTMENT  Procedure Physician: Dr. Manzanares, Dr Horvath                 Date of procedure: April 6, 2017  Telephone Numbers: [phone] Monday-Friday 8:00 am to 4:30 pm  [phone] After 4:30 pm Monday-Friday, Weekends & Holidays.   Ask for the Interventional Radiologist on call.  Someone is on call 24 hrs/day  [facility] toll free  number: [phone] Monday-Friday 8:00 am to 4:30 pm  [facility] Emergency Dept: [phone]

## 2017-04-06 NOTE — PROGRESS NOTES
Mandeep returned to 2a after having a biopsy of a sacral mass.  The biopsy site is dry and intact.  He is tolerating po.  He states he has no pain.  Spouse is at bedside.  1600:  Mandeep ambulated in the hallway without difficulty.  Discharge instructions were reviewed and patient discharged to home accompanied by his spouse.

## 2017-04-06 NOTE — PROGRESS NOTES
Patient is here for a bone biopsy.  Pre-procedure assessment is complete.  He has not been consented.  He is prepped and ready for procedure

## 2017-04-13 NOTE — MR AVS SNAPSHOT
After Visit Summary   4/13/2017    Paul Mathias Jennissen    MRN: [id number]           Patient Information     Date Of Birth          1945        Visit Information        Provider Department      4/13/2017 12:00 PM Lois Lomas MD [facility]        Today's Diagnoses     Metastasis from pancreatic cancer (H)    -  1       Follow-ups after your visit        Who to contact     If you have questions or need follow up information about today's clinic visit or your schedule please contact [facility] directly at [phone].  Normal or non-critical lab and imaging results will be communicated to you by Deltasighthart, letter or phone within 4 business days after the clinic has received the results. If you do not hear from us within 7 days, please contact the clinic through Deltasighthart or phone. If you have a critical or abnormal lab result, we will notify you by phone as soon as possible.  Submit refill requests through [employer] or call your pharmacy and they will forward the refill request to us. Please allow 3 business days for your refill to be completed.          Additional Information About Your Visit        MyChart Information     [employer] gives you secure access to your electronic health record. If you see a primary care provider, you can also send messages to your care team and make appointments. If you have questions, please call your primary care clinic.  If you do not have a primary care provider, please call [phone] and they will assist you.      Cernium is an electronic gateway that provides easy, online access to your medical records. With [employer]ium, you can request a clinic appointment, read your test results, renew a prescription or communicate with your care team.     To access your existing account, please contact your [facility] or call [phone] for assistance.        Care EveryWhere ID     This is your Care  "EveryWhere ID. This could be used by other organizations to access your [facility] medical records  [id number]        Your Vitals Were     Pulse Temperature Respirations Height Pulse Oximetry BMI (Body Mass Index)    86 97.4  F (36.3  C) (Oral) 20 1.72 m (5' 7.72\") 95% 27.29 kg/m2       Blood Pressure from Last 3 Encounters:   04/13/17 143/83   04/06/17 137/69   04/06/17 128/82    Weight from Last 3 Encounters:   04/13/17 80.7 kg (178 lb)   03/21/17 79.4 kg (175 lb)   03/16/17 81.2 kg (179 lb)              Today, you had the following     No orders found for display       Primary Care Provider Office Phone # Fax #    Allie Lomas [phone]       [facility] [address] [address]        Thank you!     Thank you for choosing [facility]  for your care. Our goal is always to provide you with excellent care. Hearing back from our patients is one way we can continue to improve our services. Please take a few minutes to complete the written survey that you may receive in the mail after your visit with us. Thank you!             Your Updated Medication List - Protect others around you: Learn how to safely use, store and throw away your medicines at www.disposemymeds.org.          This list is accurate as of: 4/13/17 11:59 PM.  Always use your most recent med list.                   Brand Name Dispense Instructions for use    calcium polycarbophil 625 MG tablet    FIBERCON     Take 2 tablets by mouth 2 times daily       DOCUSATE SODIUM PO      Take by mouth 2 times daily Reported on 3/21/2017       DULCOLAX PO      Take 4-8 tablets by mouth daily       oxyCODONE-acetaminophen 5-325 MG per tablet    PERCOCET    270 tablet    Take 1-2 tablets by mouth every 4 hours as needed for pain       TEGRETOL PO      Take 200 mg by mouth 2 times daily         "

## 2017-04-13 NOTE — NURSING NOTE
"Paul Mathias Jennissen is a 71 year old male who presents for:  Chief Complaint   Patient presents with     Oncology Clinic Visit     f/u to discuss MRI results        Initial Vitals:  /83  Pulse 86  Temp 97.4  F (36.3  C) (Oral)  Resp 20  Ht 1.72 m (5' 7.72\")  Wt 80.7 kg (178 lb)  SpO2 95%  BMI 27.29 kg/m2 Estimated body mass index is 27.29 kg/(m^2) as calculated from the following:    Height as of this encounter: 1.72 m (5' 7.72\").    Weight as of this encounter: 80.7 kg (178 lb).. Body surface area is 1.96 meters squared. BP completed using cuff size: large  No Pain (0) No LMP for male patient. Allergies and medications reviewed.     Medications: Medication refills not needed today.  Pharmacy name entered into [employer]: [facility] [address] [place] [address]    Comments:     8 minutes for nursing intake (face to face time)   JESSICA ZEPEDA LPN        "

## 2017-04-13 NOTE — NURSING NOTE
"Chemotherapy Education Notes    Met with patient, spouse, and daughter in clinic for chemotherapy education on Gemzar/Abraxane and FOLFIRINOX. ChemoCare handouts were discussed and given to patient to take home.  Reviewed the following with the patient and their support person:     Treatment Goal/Regimen/Duration: rationale for strict adherence, at home scheduled or as-needed medications, medication delivery methods; chemotherapy side effects and management of side effects, including: skin changes, anemia, neutropenia, thrombocytopenia, diarrhea/constipation, nausea/vomiting, hair loss, memory changes, mouth sores, taste changes, appetite changes, peripheral neuropathy, fatigue, cold sensitivity, flu-like symptoms, and myelosuppression.     Reviewed Port book, provided Linda handout \"Vascular Access Port Implantation,\" discussed port placement procedure and reason for port placement.     Written Information: Patient was provided with the [employer] \"My Cancer Guidebook\" folder, which includes information on the following: \"Getting Ready for Chemotherapy: What to Expect, Before, During, and After your Treatment,\" printouts from ChemoCare on specific chemotherapy drugs, \"Eating Hints: Before, During, and After Cancer Treatment,\" printouts on possible chemotherapy side effects/ways to cope with side effects, \"When to Call the Doctor,\" and \"Self-Care Tips During Cancer Treatment.\"  Also, information regarding various programs offered at [facility], and our business card with contact information given for the following: Oncology Clinic/scheduling, RN Care Coordinator, and the after-hours Nurse Advice Line.    No barriers to learning identified. Patient and family members verbalized understanding of all written and verbal information. All questions answered patient s satisfaction.  Learning barriers and method preference are documented in the patient education flowsheet.      Patient instructed to call with " further questions or concerns.  Patient states understanding and is in agreement with this plan.  He will be transferring his care to a local Oncologist for treatment and further management of his cancer closer to home.    Face to Face Time with Patient: 20 minutes    David Mcgee RN, BSN, OCN  Care Coordinator  [facility]  [phone]

## 2017-04-14 NOTE — PROGRESS NOTES
HISTORY OF PRESENT ILLNESS:  Paul Jennissen is a delightful 71-year-old gentleman with recently diagnosed pancreatic cancer who presents to clinic to review his test results and finalize our plan.  When I last saw the patient, we had reviewed his PET scan which showed an indeterminate enhancing lesion in the right L4-L5 neural foramen.  Patient then went on to have a lumbar MRI which was performed at [facility] which did confirm a bony metastatic disease involving the left lateral aspect of S1-S2.  There is also a lesion in the posterior aspect of L3 vertebral body.  No other findings of metastatic disease.  Patient was to have a lung biopsy, but since we now had bony lesions to access, this was biopsied instead.  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      On today's visit, I reviewed with the patient, his wife, 2 daughters and a son-in-law regarding the findings to date.  Explained that based on the bone biopsy that this is considered to be stage IV pancreatic cancer.  Reviewed that his disease is not curable and the goal is to prolong his life while maintaining his quality of life.  I reviewed his options.  One is to consider standard therapy.  I initially had recommended gemcitabine, Abraxane and reviewed chemotherapy in general terms.  His daughter brought up another possibility of Folfirinox.  Explained that this is also within standard of care; however, this tends to be a little bit more difficult to tolerate.  Mandeep has stated all along that quality of life is of utmost importance to him, and therefore, that is why I had recommend gemcitabine, Abraxane initially, thinking it would be easier for him to tolerate.  However, gemcitabine, Abraxane and Folfirinox are first line standard options.  The second option is to consider a clinical trial.  We do not have any available at our Cancer  Center right now, but if patient is interested, he can look into this further.  The third option is to do nothing, which I would not recommend since his cancer would only continue to grow.  We repeatedly reviewed during our discussion that Mandeep has stated previously that it is most important that he maintains his quality of life.  Explained that even if he were to initiate treatment, if he was unable to tolerate it, he can stop at any time.  Also that his primary oncologist will continue to adjust dosing based on Mandeep's needs.  Patient lives 2 hours away and we thought it would be best for him to receive his care closer to home.  There is likely an Oncology Clinic in [facility].  If not, they can go to [place].      Mandeep's family members did have a number of questions regarding prognosis.  I did again review that his disease is not curable, but if he is tolerating therapy this could probably prolong his survival with a good quality of life.      Patient and his family members were given the educational material regarding gemcitabine, Abraxane and Folfirinox.  The patient's wife will be contacting their local clinic to establish oncology care.  We will also be sending a copy of our note to their local clinic.  If there are any questions or concerns, they are to feel free to contact us.      The patient has been followed by our Palliative Care team in regards to his pain.  Overall, it is under control, but his medications are being adjusted by the team.  I advised the patient that once he has settled in with an Oncology Clinic close to home, it may be best to transfer his palliative care to that clinic; however, we would always be open to continue to help him in any way possible.      At the end discussion, all questions addressed to the best of my ability.  The patient and his family members have verbalized understanding and agreeable with plan.      Total time spent 20 minutes, with greater than 50% spent  in counseling and coordinating care.         RIKKI LOMAS MD             D: 2017 14:00   T: 2017 09:52   MT: CLAUDETTE      Name:     JENNISSEN, PAUL   MRN:      [id number]        Account:      [id number]   :      1945           Visit Date:   2017      Document: [id number]       cc: Allie Lomas MD

## 2017-04-14 NOTE — TELEPHONE ENCOUNTER
Patients wife Dina called stating that Jere pain has increased and that he is taking Percocet 8 tabs per day in addition to MS Contin 15mgs BID   Dr. Berry has prescribed an increase in MS Contin to 30mgs BID, patient will take x2 15mg tablets and we will mail a new prescription to him today  Jere wife asks that further genetic testing and palliative care appointments be cancelled and states she will be be establishing care with her family MD in her area for all continued care

## 2017-06-27 PROBLEM — Z71.89 ADVANCED DIRECTIVES, COUNSELING/DISCUSSION: Chronic | Status: ACTIVE | Noted: 2017-01-01

## 2023-04-03 PROBLEM — C79.51 MALIGNANT NEOPLASM METASTATIC TO BONE (H): Status: ACTIVE | Noted: 2017-01-01

## 2024-06-17 PROBLEM — Z71.89 ADVANCED DIRECTIVES, COUNSELING/DISCUSSION: Status: RESOLVED | Noted: 2017-01-01 | Resolved: 2024-06-17

## (undated) RX ORDER — FENTANYL CITRATE 50 UG/ML
INJECTION, SOLUTION INTRAMUSCULAR; INTRAVENOUS
Status: DISPENSED
Start: 2017-01-01

## (undated) RX ORDER — SODIUM CHLORIDE 9 MG/ML
INJECTION, SOLUTION INTRAVENOUS
Status: DISPENSED
Start: 2017-01-01